# Patient Record
Sex: FEMALE | Race: WHITE | NOT HISPANIC OR LATINO | Employment: PART TIME | URBAN - METROPOLITAN AREA
[De-identification: names, ages, dates, MRNs, and addresses within clinical notes are randomized per-mention and may not be internally consistent; named-entity substitution may affect disease eponyms.]

---

## 2018-01-08 ENCOUNTER — ALLSCRIPTS OFFICE VISIT (OUTPATIENT)
Dept: OTHER | Facility: OTHER | Age: 48
End: 2018-01-08

## 2018-01-08 LAB
BILIRUB UR QL STRIP: NORMAL
CLARITY UR: NORMAL
COLOR UR: YELLOW
GLUCOSE (HISTORICAL): NORMAL
HGB UR QL STRIP.AUTO: 50
KETONES UR STRIP-MCNC: NORMAL MG/DL
LEUKOCYTE ESTERASE UR QL STRIP: NORMAL
NITRITE UR QL STRIP: NORMAL
PH UR STRIP.AUTO: 6.5 [PH]
PROT UR STRIP-MCNC: 15 MG/DL
SP GR UR STRIP.AUTO: 1.01
UROBILINOGEN UR QL STRIP.AUTO: 0.2

## 2018-01-09 NOTE — PROGRESS NOTES
Assessment   1  Pain of paraspinal muscle (729 1) (M79 1)    Plan   Lower back pain, Urinary frequency    · Urine Dip Non-Automated- POC; Status:Complete;   Done: [id number] 03:16PM  Pain of paraspinal muscle    · Naproxen 500 MG Oral Tablet; TAKE 1 TABLET EVERY 12 HOURS AS NEEDED    Discussion/Summary      47F with: Subacute lumbar paraspinal muscle pain- likely related to perimenopause and menstrual cycle related dysmenorrhea  No evidence of nephrolithiasis or UTI, normal urine dip in the office as below  500mg BID starting 2 days before your period heating pad if symptoms worsen or do not improve  The patient was counseled regarding instructions for management,-- prognosis,-- patient and family education,-- impressions  Possible side effects of new medications were reviewed with the patient/guardian today  The treatment plan was reviewed with the patient/guardian  The patient/guardian understands and agrees with the treatment plan      Chief Complaint   1  Back Pain   2  Urinary Frequency    History of Present Illness   HPI: Just started with perimenopausal symptoms, has on and off periods  Has been having periodic back pain  Didnt have a period for most of the summer  Has the lower back aching that started around December and tried heating pads on it, which resolved with her period  She then had period bleeding again a few days ago and the back ache came back  Aleve 220mg PRN which does help   dysuria, urgency, hesitancy, frequency, hematuria  Has a vague urinary fullness  had many UTIs in the past   back pain is in the middle of lower back  Urinary Frequency: Salma Darling presents with complaints of no urinary frequency        Associated symptoms include low back pain-- and-- menstrual change, but-- no urinary urgency,-- no urinary incontinence,-- no dysuria,-- no fever,-- no chills,-- no suprapubic pain,-- no nausea,-- no vomiting,-- no diarrhea,-- no constipation,-- no peripheral edema,-- no vaginal discharge-- and-- no vaginal itching  Review of Systems        Constitutional: no fever-- and-- no chills  Gastrointestinal: no nausea-- and-- no diarrhea  Genitourinary: dysuria, but-- as noted in HPI-- and-- no incontinence  Active Problems   1  Conjunctivitis (372 30) (H10 9)   2  Dermatitis (692 9) (L30 9)   3  Encounter for routine gynecological examination with Papanicolaou smear of cervix     (V72 31,V76 2) (Z01 419)   4  Encounter for screening mammogram for malignant neoplasm of breast (V76 12)     (Z12 31)   5  Screening for HPV (human papillomavirus) (V73 81) (Z11 51)   6  Sinusitis (473 9) (J32 9)    Past Medical History   1  History of  3 (V22 2) (Z33 1)   2  History of pregnancy (V13 29)  Active Problems And Past Medical History Reviewed: The active problems and past medical history were reviewed and updated today  Family History   Mother    1  Family history of cardiac disorder (V17 49) (Z82 49)   2  Family history of diabetes mellitus (V18 0) (Z83 3)   3  Family history of hypertension (V17 49) (Z82 49)   4  Family history of peptic ulcer (V18 59) (Z83 79)   5  Family history of varicose veins (V17 49) (Z82 49)  Father    6  Family history of cardiac disorder (V17 49) (Z82 49)   7  Family history of diabetes mellitus (V18 0) (Z83 3)   8  Family history of hypertension (V17 49) (Z82 49)   9  Family history of peptic ulcer (V18 59) (Z83 79)  Family History    10  Family history of Coronary Artery Disease (V17 49)    Social History    · Never A Smoker    Surgical History   1  History of  Section   2  History of Tubal Ligation    Current Meds    1  Calcium 600+D 600-400 MG-UNIT Oral Tablet; Take as directed Recorded   2  Daily Value Multivitamin TABS; Take 1 tablet daily Recorded     The medication list was reviewed and updated today  Allergies   1   No Known Drug Allergies    Vitals    Recorded: [id number] 03:07PM   Temperature 98 5 F   Heart Rate 80 Respiration 16   Systolic 022, LUE, Sitting   Diastolic 88, LUE, Sitting   Height 5 ft 5 5 in   Weight 274 lb    BMI Calculated 44 9   BSA Calculated 2 27     Physical Exam        Constitutional      General appearance: Abnormal   obese  Pulmonary      Respiratory effort: No increased work of breathing or signs of respiratory distress  Auscultation of lungs: Clear to auscultation  Cardiovascular      Auscultation of heart: Normal rate and rhythm, normal S1 and S2, without murmurs  Abdomen      Abdomen: Non-tender, no masses  -- no CVA or suprapubic tenderness  Musculoskeletal      Gait and station: Normal  -- negative SLR  Digits and nails: Normal without clubbing or cyanosis         Inspection/palpation of joints, bones, and muscles: Normal           Results/Data   Urine Dip Non-Automated- POC [id number] 03:16PM Mina Jeri      Test Name Result Flag Reference   Color Yellow     Clarity Transparent     Leukocytes 15+/-     Nitrite neg     Blood 50     Bilirubin neg     Urobilinogen 0 2     Protein 15     Ph 6 5     Specific Gravity 1 010     Ketone neg     Glucose neg        Urine Dip Non-Automated- POC [id number] 03:16PM Mina Ledyard      Test Name Result Flag Reference   Color Yellow     Clarity Transparent     Leukocytes 15+/-     Nitrite neg     Blood 50     Bilirubin neg     Urobilinogen 0 2     Protein 15     Ph 6 5     Specific Gravity 1 010     Ketone neg     Glucose neg        Signatures    Electronically signed by : ALL Ghosh ; Jan 8 2018  4:40PM EST                       (Author)

## 2018-01-12 NOTE — RESULT NOTES
Message   Recorded as Task   Date: 07/15/2016 10:45 AM, Created By: Wendie Lainez   Task Name: Verify Patient Results   Assigned To:  Maile Carrillo   Regarding Patient: Dinorah Zheng, Status: Active   [facility] Alexey - 15 Jul 2016 10:45 AM        Maile Carrillo - 15 Jul 2016 3:27 PM     TASK EDITED  Card sent that pap is normal        Signatures   Electronically signed by : eNll Ford CNM; Jul 15 2016  3:27PM EST                       (Author)

## 2018-01-23 VITALS
HEIGHT: 66 IN | WEIGHT: 274 LBS | RESPIRATION RATE: 16 BRPM | HEART RATE: 80 BPM | BODY MASS INDEX: 44.03 KG/M2 | SYSTOLIC BLOOD PRESSURE: 142 MMHG | TEMPERATURE: 98.5 F | DIASTOLIC BLOOD PRESSURE: 88 MMHG

## 2018-08-14 ENCOUNTER — OFFICE VISIT (OUTPATIENT)
Dept: URGENT CARE | Facility: MEDICAL CENTER | Age: 48
End: 2018-08-14
Payer: COMMERCIAL

## 2018-08-14 ENCOUNTER — APPOINTMENT (OUTPATIENT)
Dept: RADIOLOGY | Facility: MEDICAL CENTER | Age: 48
End: 2018-08-14
Payer: COMMERCIAL

## 2018-08-14 VITALS
HEART RATE: 82 BPM | TEMPERATURE: 98 F | HEIGHT: 66 IN | RESPIRATION RATE: 18 BRPM | OXYGEN SATURATION: 97 % | DIASTOLIC BLOOD PRESSURE: 92 MMHG | BODY MASS INDEX: 42.59 KG/M2 | SYSTOLIC BLOOD PRESSURE: 146 MMHG | WEIGHT: 265 LBS

## 2018-08-14 DIAGNOSIS — M25.562 ACUTE PAIN OF LEFT KNEE: ICD-10-CM

## 2018-08-14 DIAGNOSIS — M25.562 ACUTE PAIN OF LEFT KNEE: Primary | ICD-10-CM

## 2018-08-14 PROCEDURE — G0382 LEV 3 HOSP TYPE B ED VISIT: HCPCS | Performed by: PHYSICIAN ASSISTANT

## 2018-08-14 PROCEDURE — 73562 X-RAY EXAM OF KNEE 3: CPT

## 2018-08-14 RX ORDER — NAPROXEN 500 MG/1
1 TABLET ORAL EVERY 12 HOURS PRN
COMMUNITY
Start: 2018-01-08 | End: 2020-10-19 | Stop reason: ALTCHOICE

## 2018-08-14 RX ORDER — ERGOCALCIFEROL (VITAMIN D2) 10 MCG
1 TABLET ORAL DAILY
COMMUNITY

## 2018-08-14 NOTE — PROGRESS NOTES
[facility]        NAME: Claudine Hickman is a 46 y o  female  : 1970    MRN: [id number]  DATE: 2018  TIME: 1:23 PM    Assessment and Plan   Acute pain of left knee [M25 562]  1  Acute pain of left knee  XR knee 3 vw left non injury         Patient Instructions     Patient Instructions   Rice measures  Take naproxen as directed  Follow up with PCP in 3-5 days  Proceed to  ER if symptoms worsen  Chief Complaint     Chief Complaint   Patient presents with    Knee Pain     for a few weeks left knee has been achey, last night when she sat down felt like a rip in the left outter aspect  hurts to weight bear  some chills last night  taking ice and advil         History of Present Illness       This is a 57-year-old female complaining of left knee pain x1 week  Patient denies any injury trauma or accident  Patient denies any previous injury  Pain is made worse with ambulation  Patient denies any OTC medications  Patient denies any giving out sensation, locking or popping  Knee Pain          Review of Systems   Review of Systems   Constitutional: Negative for chills and fever  HENT: Negative  Eyes: Negative  Respiratory: Negative for chest tightness, shortness of breath and wheezing  Cardiovascular: Negative for chest pain and palpitations  Musculoskeletal: Positive for arthralgias  Skin: Negative for rash           Current Medications       Current Outpatient Prescriptions:     Multiple Vitamin (DAILY VALUE MULTIVITAMIN) TABS, Take 1 tablet by mouth daily, Disp: , Rfl:     naproxen (NAPROSYN) 500 mg tablet, Take 1 tablet by mouth every 12 (twelve) hours as needed, Disp: , Rfl:     Current Allergies     Allergies as of 2018    (No Known Allergies)            The following portions of the patient's history were reviewed and updated as appropriate: allergies, current medications, past family history, past medical history, past social history, past surgical history and problem list      No past medical history on file  No past surgical history on file  No family history on file  Medications have been verified  Objective   /92   Pulse 82   Temp 98 °F (36 7 °C) (Temporal)   Resp 18   Ht 5' 6" (1 676 m)   Wt 120 kg (265 lb)   LMP 08/06/2018   SpO2 97%   BMI 42 77 kg/m²        Physical Exam     Physical Exam   Constitutional: She is oriented to person, place, and time  She appears well-developed and well-nourished  No distress  Cardiovascular: Normal rate, regular rhythm and normal heart sounds  Pulmonary/Chest: Effort normal and breath sounds normal  No respiratory distress  Musculoskeletal: She exhibits tenderness  Left knee: She exhibits normal range of motion, no swelling and no effusion  Tenderness found  Medial joint line tenderness noted  Negative Lachman's, negative Cooley's, negative pain with valgus/vargus stress   Neurological: She is alert and oriented to person, place, and time  Skin: No rash noted  Nursing note and vitals reviewed

## 2018-10-08 ENCOUNTER — ANNUAL EXAM (OUTPATIENT)
Dept: OBGYN CLINIC | Facility: MEDICAL CENTER | Age: 48
End: 2018-10-08
Payer: COMMERCIAL

## 2018-10-08 VITALS — WEIGHT: 277.8 LBS | DIASTOLIC BLOOD PRESSURE: 76 MMHG | SYSTOLIC BLOOD PRESSURE: 144 MMHG | BODY MASS INDEX: 44.84 KG/M2

## 2018-10-08 DIAGNOSIS — Z12.39 SCREENING FOR MALIGNANT NEOPLASM OF BREAST: ICD-10-CM

## 2018-10-08 DIAGNOSIS — Z01.419 ENCOUNTER FOR GYNECOLOGICAL EXAMINATION (GENERAL) (ROUTINE) WITHOUT ABNORMAL FINDINGS: Primary | ICD-10-CM

## 2018-10-08 PROCEDURE — 99396 PREV VISIT EST AGE 40-64: CPT | Performed by: PHYSICIAN ASSISTANT

## 2018-10-08 RX ORDER — CHLORAL HYDRATE 500 MG
1000 CAPSULE ORAL DAILY
COMMUNITY

## 2018-10-08 RX ORDER — LANOLIN ALCOHOL/MO/W.PET/CERES
1 CREAM (GRAM) TOPICAL 3 TIMES DAILY
COMMUNITY

## 2018-10-08 NOTE — PROGRESS NOTES
Assessment/Plan  Problem List Items Addressed This Visit     Encounter for gynecological examination (general) (routine) without abnormal findings - Primary     Annual exam performed  mammo rx given  RTO 1 year           Other Visit Diagnoses     Screening for malignant neoplasm of breast        Relevant Orders    Mammo screening bilateral w cad        Subjective   Tamie Najera is a 46 y o  female who presents for annual GYN exam  She denies any changes in Medical, Surgical or Family histories  Periods are regular every 28-30 days, however has skipped a few menses here and there  Dysmenorrhea:none  She denies intermenstrual bleeding, spotting, or discharge  She reports that she is sexually active with 1 partner and using none for contraception  Last Pap smear: 2016  Regular self breast exam: yes  Last mammogram:   Family history of uterine or ovarian cancer: no  Family history of breast cancer: no  Family history of colon cancer: no    Menstrual History:  OB History      Para Term  AB Living    2 2            SAB TAB Ectopic Multiple Live Births                      Patient's last menstrual period was 2018  Period Pattern: Regular  Menstrual Flow: Moderate    The following portions of the patient's history were reviewed and updated as appropriate: allergies, current medications, past family history, past medical history, past social history, past surgical history and problem list     Review of Systems  Review of Systems   Constitutional: Negative for chills and fever  Respiratory: Negative for shortness of breath  Cardiovascular: Negative for chest pain  Gastrointestinal: Negative for abdominal pain  Genitourinary: Negative for dysuria, pelvic pain, vaginal bleeding, vaginal discharge and vaginal pain  Negative for breast pain or lumps  (+) mild stress urinary incontinence  Neurological: Negative for headaches        Objective   /76 (BP Location: Right arm)   Wt 126 kg (277 lb 12 8 oz)   LMP 09/12/2018   BMI 44 84 kg/m²     Physical Exam   Constitutional: She is oriented to person, place, and time  She appears well-developed and well-nourished  Neck: No thyromegaly present  Cardiovascular: Normal rate and regular rhythm  Pulmonary/Chest: Effort normal and breath sounds normal  Right breast exhibits no mass, no nipple discharge, no skin change and no tenderness  Left breast exhibits no mass, no nipple discharge, no skin change and no tenderness  Abdominal: Soft  There is no tenderness  There is no rebound and no guarding  Genitourinary: There is no rash or lesion on the right labia  There is no rash or lesion on the left labia  Uterus is not enlarged and not tender  Cervix exhibits no motion tenderness and no discharge  Right adnexum displays no mass and no tenderness  Left adnexum displays no mass and no tenderness  No bleeding in the vagina  No vaginal discharge found  Neurological: She is alert and oriented to person, place, and time  Psychiatric: She has a normal mood and affect  Nursing note and vitals reviewed

## 2018-10-16 ENCOUNTER — HOSPITAL ENCOUNTER (OUTPATIENT)
Dept: RADIOLOGY | Facility: MEDICAL CENTER | Age: 48
Discharge: HOME | End: 2018-10-16
Payer: COMMERCIAL

## 2018-10-16 DIAGNOSIS — Z12.39 SCREENING FOR MALIGNANT NEOPLASM OF BREAST: ICD-10-CM

## 2018-10-16 PROCEDURE — 77067 SCR MAMMO BI INCL CAD: CPT

## 2020-10-19 ENCOUNTER — OFFICE VISIT (OUTPATIENT)
Dept: FAMILY MEDICINE CLINIC | Facility: CLINIC | Age: 50
End: 2020-10-19
Payer: COMMERCIAL

## 2020-10-19 VITALS
BODY MASS INDEX: 43.99 KG/M2 | OXYGEN SATURATION: 97 % | TEMPERATURE: 98.6 F | SYSTOLIC BLOOD PRESSURE: 142 MMHG | DIASTOLIC BLOOD PRESSURE: 88 MMHG | RESPIRATION RATE: 18 BRPM | WEIGHT: 264 LBS | HEIGHT: 65 IN | HEART RATE: 88 BPM

## 2020-10-19 DIAGNOSIS — Z13.1 ENCOUNTER FOR SCREENING EXAMINATION FOR IMPAIRED GLUCOSE REGULATION AND DIABETES MELLITUS: Primary | ICD-10-CM

## 2020-10-19 DIAGNOSIS — E55.9 VITAMIN D DEFICIENCY: ICD-10-CM

## 2020-10-19 DIAGNOSIS — R03.0 ELEVATED BLOOD PRESSURE READING WITHOUT DIAGNOSIS OF HYPERTENSION: ICD-10-CM

## 2020-10-19 DIAGNOSIS — Z12.31 ENCOUNTER FOR SCREENING MAMMOGRAM FOR MALIGNANT NEOPLASM OF BREAST: ICD-10-CM

## 2020-10-19 DIAGNOSIS — R03.0 ELEVATED BLOOD-PRESSURE READING WITHOUT DIAGNOSIS OF HYPERTENSION: ICD-10-CM

## 2020-10-19 DIAGNOSIS — R27.8 WORSENED HANDWRITING: ICD-10-CM

## 2020-10-19 DIAGNOSIS — E66.01 MORBID OBESITY DUE TO EXCESS CALORIES (HCC): ICD-10-CM

## 2020-10-19 DIAGNOSIS — Z23 ENCOUNTER FOR IMMUNIZATION: ICD-10-CM

## 2020-10-19 DIAGNOSIS — Z12.11 SCREENING FOR COLON CANCER: ICD-10-CM

## 2020-10-19 DIAGNOSIS — Z13.220 SCREENING FOR HYPERLIPIDEMIA: ICD-10-CM

## 2020-10-19 PROCEDURE — 90471 IMMUNIZATION ADMIN: CPT

## 2020-10-19 PROCEDURE — 99396 PREV VISIT EST AGE 40-64: CPT | Performed by: FAMILY MEDICINE

## 2020-10-19 PROCEDURE — 3725F SCREEN DEPRESSION PERFORMED: CPT | Performed by: FAMILY MEDICINE

## 2020-10-19 PROCEDURE — 1036F TOBACCO NON-USER: CPT | Performed by: FAMILY MEDICINE

## 2020-10-19 PROCEDURE — 90715 TDAP VACCINE 7 YRS/> IM: CPT

## 2020-10-19 RX ORDER — ASCORBIC ACID 500 MG
500 TABLET ORAL DAILY
COMMUNITY

## 2020-11-01 ENCOUNTER — PATIENT MESSAGE (OUTPATIENT)
Dept: FAMILY MEDICINE CLINIC | Facility: CLINIC | Age: 50
End: 2020-11-01

## 2020-11-01 DIAGNOSIS — R25.9 PARKINSONIAN FEATURES: ICD-10-CM

## 2020-11-01 DIAGNOSIS — R27.8 WORSENED HANDWRITING: Primary | ICD-10-CM

## 2020-11-02 LAB
25(OH)D3 SERPL-MCNC: 34 NG/ML (ref 30–100)
ALBUMIN/CREAT UR: 4 MCG/MG CREAT
BASOPHILS # BLD AUTO: 59 CELLS/UL (ref 0–200)
BASOPHILS NFR BLD AUTO: 0.9 %
BUN SERPL-MCNC: 11 MG/DL (ref 7–25)
BUN/CREAT SERPL: NORMAL (CALC) (ref 6–22)
CALCIUM SERPL-MCNC: 9.4 MG/DL (ref 8.6–10.4)
CHLORIDE SERPL-SCNC: 103 MMOL/L (ref 98–110)
CHOLEST SERPL-MCNC: 198 MG/DL
CHOLEST/HDLC SERPL: 3.4 (CALC)
CO2 SERPL-SCNC: 27 MMOL/L (ref 20–32)
CREAT SERPL-MCNC: 0.58 MG/DL (ref 0.5–1.05)
CREAT UR-MCNC: 53 MG/DL (ref 20–275)
EOSINOPHIL # BLD AUTO: 78 CELLS/UL (ref 15–500)
EOSINOPHIL NFR BLD AUTO: 1.2 %
ERYTHROCYTE [DISTWIDTH] IN BLOOD BY AUTOMATED COUNT: 11.7 % (ref 11–15)
GLUCOSE SERPL-MCNC: 97 MG/DL (ref 65–99)
HBA1C MFR BLD: 5.4 % OF TOTAL HGB
HCT VFR BLD AUTO: 43.4 % (ref 35–45)
HDLC SERPL-MCNC: 59 MG/DL
HGB BLD-MCNC: 14.1 G/DL (ref 11.7–15.5)
LDLC SERPL CALC-MCNC: 114 MG/DL (CALC)
LYMPHOCYTES # BLD AUTO: 2392 CELLS/UL (ref 850–3900)
LYMPHOCYTES NFR BLD AUTO: 36.8 %
MCH RBC QN AUTO: 29.3 PG (ref 27–33)
MCHC RBC AUTO-ENTMCNC: 32.5 G/DL (ref 32–36)
MCV RBC AUTO: 90 FL (ref 80–100)
MICROALBUMIN UR-MCNC: 0.2 MG/DL
MONOCYTES # BLD AUTO: 572 CELLS/UL (ref 200–950)
MONOCYTES NFR BLD AUTO: 8.8 %
NEUTROPHILS # BLD AUTO: 3400 CELLS/UL (ref 1500–7800)
NEUTROPHILS NFR BLD AUTO: 52.3 %
NONHDLC SERPL-MCNC: 139 MG/DL (CALC)
PLATELET # BLD AUTO: 324 THOUSAND/UL (ref 140–400)
PMV BLD REES-ECKER: 12 FL (ref 7.5–12.5)
POTASSIUM SERPL-SCNC: 4.2 MMOL/L (ref 3.5–5.3)
RBC # BLD AUTO: 4.82 MILLION/UL (ref 3.8–5.1)
SL AMB EGFR AFRICAN AMERICAN: 123 ML/MIN/1.73M2
SL AMB EGFR NON AFRICAN AMERICAN: 106 ML/MIN/1.73M2
SODIUM SERPL-SCNC: 138 MMOL/L (ref 135–146)
TRIGL SERPL-MCNC: 135 MG/DL
TSH SERPL-ACNC: 1.47 MIU/L
WBC # BLD AUTO: 6.5 THOUSAND/UL (ref 3.8–10.8)

## 2020-11-04 ENCOUNTER — TELEPHONE (OUTPATIENT)
Dept: NEUROLOGY | Facility: CLINIC | Age: 50
End: 2020-11-04

## 2020-11-04 ENCOUNTER — TELEPHONE (OUTPATIENT)
Dept: FAMILY MEDICINE CLINIC | Facility: CLINIC | Age: 50
End: 2020-11-04

## 2020-11-10 DIAGNOSIS — Z91.89 AT HIGH RISK FOR TICK BORNE ILLNESS: Primary | ICD-10-CM

## 2020-11-17 LAB — B BURGDOR AB SER IA-ACNC: <0.9 INDEX

## 2020-12-01 ENCOUNTER — CLINICAL SUPPORT (OUTPATIENT)
Dept: FAMILY MEDICINE CLINIC | Facility: CLINIC | Age: 50
End: 2020-12-01
Payer: COMMERCIAL

## 2020-12-01 ENCOUNTER — TELEPHONE (OUTPATIENT)
Dept: FAMILY MEDICINE CLINIC | Facility: CLINIC | Age: 50
End: 2020-12-01

## 2020-12-01 VITALS
TEMPERATURE: 97.3 F | SYSTOLIC BLOOD PRESSURE: 124 MMHG | BODY MASS INDEX: 41.95 KG/M2 | RESPIRATION RATE: 18 BRPM | HEART RATE: 76 BPM | HEIGHT: 66 IN | WEIGHT: 261 LBS | OXYGEN SATURATION: 96 % | DIASTOLIC BLOOD PRESSURE: 64 MMHG

## 2020-12-01 DIAGNOSIS — I10 HYPERTENSION, UNSPECIFIED TYPE: Primary | ICD-10-CM

## 2020-12-01 PROCEDURE — 99211 OFF/OP EST MAY X REQ PHY/QHP: CPT

## 2020-12-01 PROCEDURE — 3008F BODY MASS INDEX DOCD: CPT

## 2020-12-01 PROCEDURE — 1036F TOBACCO NON-USER: CPT

## 2020-12-02 PROBLEM — Z01.419 ENCOUNTER FOR GYNECOLOGICAL EXAMINATION (GENERAL) (ROUTINE) WITHOUT ABNORMAL FINDINGS: Status: RESOLVED | Noted: 2018-10-08 | Resolved: 2020-12-02

## 2020-12-23 ENCOUNTER — TELEPHONE (OUTPATIENT)
Dept: FAMILY MEDICINE CLINIC | Facility: CLINIC | Age: 50
End: 2020-12-23

## 2021-02-11 ENCOUNTER — OFFICE VISIT (OUTPATIENT)
Dept: URGENT CARE | Facility: MEDICAL CENTER | Age: 51
End: 2021-02-11
Payer: COMMERCIAL

## 2021-02-11 VITALS
SYSTOLIC BLOOD PRESSURE: 150 MMHG | OXYGEN SATURATION: 98 % | HEART RATE: 73 BPM | RESPIRATION RATE: 18 BRPM | TEMPERATURE: 98.3 F | WEIGHT: 267 LBS | DIASTOLIC BLOOD PRESSURE: 68 MMHG | BODY MASS INDEX: 42.91 KG/M2 | HEIGHT: 66 IN

## 2021-02-11 DIAGNOSIS — S00.31XA ABRASION OF NOSE, INITIAL ENCOUNTER: Primary | ICD-10-CM

## 2021-02-11 PROCEDURE — G0382 LEV 3 HOSP TYPE B ED VISIT: HCPCS | Performed by: PHYSICIAN ASSISTANT

## 2021-02-11 NOTE — PATIENT INSTRUCTIONS
1  Keep skin clean and dry  2  Watch for S&S of infection (redness, swelling, drainage, fever)  3  Follow up with PCP in 3-5 days if symptoms persist or worsen

## 2021-02-11 NOTE — PROGRESS NOTES
[facility]        NAME: Jah Ramírez is a 44 y o  female  : 1970    MRN: [id number]  DATE: 2021  TIME: 4:32 PM    Assessment and Plan   Abrasion of nose, initial encounter Dipika Pimentel  1  Abrasion of nose, initial encounter           Patient Instructions     1  Keep skin clean and dry  2  Watch for S&S of infection (redness, swelling, drainage, fever)  3  Follow up with PCP in 3-5 days if symptoms persist or worsen  Chief Complaint     Chief Complaint   Patient presents with    Fall     Pt stepped in a pothole and scraped her face with her glasses  Pt has several superfical scratched on her dfface and one very small laceration on the right side of the bridge of her nose  Pt denies losing consciousness  Tdap: 10/19/20  History of Present Illness       [place] is a 49-year-old female presents with a small abrasion on the right side of her nose after fall that occurred earlier today  Patient reports she was walking when she stepped in a hole and fell which cause her glasses to scrape the right side of her nose  Patient denies hitting her head, she has had no loss of consciousness, post injury vomiting, headache or dizziness since the event  Patient reports her last tetanus vaccine was 1 year prior  Review of Systems   Review of Systems   Constitutional: Negative  HENT: Negative  Respiratory: Negative  Cardiovascular: Negative  Gastrointestinal: Negative  Skin: Positive for wound           Current Medications       Current Outpatient Medications:     ascorbic acid (VITAMIN C) 500 mg tablet, Take 500 mg by mouth daily, Disp: , Rfl:     carbidopa-levodopa (SINEMET)  mg per tablet, Take 1 tablet by mouth Three times a day, Disp: , Rfl:     glucosamine-chondroitin 500-400 MG tablet, Take 1 tablet by mouth 3 (three) times a day, Disp: , Rfl:     Multiple Vitamin (DAILY VALUE MULTIVITAMIN) TABS, Take 1 tablet by mouth daily, Disp: , Rfl:     Omega-3 Fatty Acids (FISH OIL) 1,000 mg, Take 1,000 mg by mouth daily, Disp: , Rfl:     Current Allergies     Allergies as of 02/11/2021    (No Known Allergies)            The following portions of the patient's history were reviewed and updated as appropriate: allergies, current medications, past family history, past medical history, past social history, past surgical history and problem list      Past Medical History:   Diagnosis Date    Parkinson disease ([facility] [address] )        History reviewed  No pertinent surgical history  History reviewed  No pertinent family history  Medications have been verified  Objective   /68   Pulse 73   Temp 98 3 °F (36 8 °C) (Temporal)   Resp 18   Ht 5' 6" (1 676 m)   Wt 121 kg (267 lb)   LMP 12/29/2020 (Approximate)   SpO2 98%   BMI 43 09 kg/m²   Patient's last menstrual period was 12/29/2020 (approximate)  Physical Exam     Physical Exam  Constitutional:       General: She is not in acute distress  Appearance: Normal appearance  She is not ill-appearing  HENT:      Head: Normocephalic and atraumatic  Right Ear: Tympanic membrane and ear canal normal       Left Ear: Tympanic membrane and ear canal normal       Nose: Signs of injury present  Mouth/Throat:      Lips: Pink  Pharynx: Oropharynx is clear  Cardiovascular:      Rate and Rhythm: Normal rate and regular rhythm  Heart sounds: Normal heart sounds  No murmur  Pulmonary:      Effort: Pulmonary effort is normal       Breath sounds: Normal breath sounds  Neurological:      Mental Status: She is alert  Skin was cleaned with Betadine wash and Dermabond was applied over the top of the abrasion  Patient tolerated procedure well, there were no complications

## 2021-03-03 ENCOUNTER — TELEPHONE (OUTPATIENT)
Dept: FAMILY MEDICINE CLINIC | Facility: CLINIC | Age: 51
End: 2021-03-03

## 2021-03-03 DIAGNOSIS — Z11.52 ENCOUNTER FOR SCREENING FOR COVID-19: Primary | ICD-10-CM

## 2021-03-03 PROCEDURE — U0003 INFECTIOUS AGENT DETECTION BY NUCLEIC ACID (DNA OR RNA); SEVERE ACUTE RESPIRATORY SYNDROME CORONAVIRUS 2 (SARS-COV-2) (CORONAVIRUS DISEASE [COVID-19]), AMPLIFIED PROBE TECHNIQUE, MAKING USE OF HIGH THROUGHPUT TECHNOLOGIES AS DESCRIBED BY CMS-2020-01-R: HCPCS | Performed by: FAMILY MEDICINE

## 2021-03-03 PROCEDURE — U0005 INFEC AGEN DETEC AMPLI PROBE: HCPCS | Performed by: FAMILY MEDICINE

## 2021-03-03 NOTE — TELEPHONE ENCOUNTER
Patient called back asking if order for COVID test was put in since she wants to go get done today   Advised order was placed

## 2021-03-03 NOTE — TELEPHONE ENCOUNTER
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

## 2021-03-03 NOTE — PROGRESS NOTES
Patient called back stating she did want to have COVID test done after checking with insurance that it is covered

## 2021-03-04 ENCOUNTER — TELEPHONE (OUTPATIENT)
Dept: FAMILY MEDICINE CLINIC | Facility: CLINIC | Age: 51
End: 2021-03-04

## 2021-03-04 LAB — SARS-COV-2 RNA RESP QL NAA+PROBE: NEGATIVE

## 2021-03-04 NOTE — TELEPHONE ENCOUNTER
Previously spoke to patient about potential for shortened quarantine ending tomorrow 3/5/21  Left detailed message for patient on voicemail  Told to call office with any questions

## 2021-03-04 NOTE — TELEPHONE ENCOUNTER
Call re COV 19 test NEGATIVE    The CDC recommends 14 days of quarantine to reduce risk of transmission  Quarantine can be shortened to 7 days provided that patient remains symptom free and COVID test is negative and done on at least day 5 after exposure  Patient should continue to monitor for symptoms through day 14

## 2021-03-10 DIAGNOSIS — Z23 ENCOUNTER FOR IMMUNIZATION: ICD-10-CM

## 2021-03-29 ENCOUNTER — TELEPHONE (OUTPATIENT)
Dept: FAMILY MEDICINE CLINIC | Facility: CLINIC | Age: 51
End: 2021-03-29

## 2021-06-24 ENCOUNTER — TELEPHONE (OUTPATIENT)
Dept: FAMILY MEDICINE CLINIC | Facility: CLINIC | Age: 51
End: 2021-06-24

## 2021-11-23 ENCOUNTER — OFFICE VISIT (OUTPATIENT)
Dept: OBGYN CLINIC | Facility: MEDICAL CENTER | Age: 51
End: 2021-11-23
Payer: COMMERCIAL

## 2021-11-23 VITALS — DIASTOLIC BLOOD PRESSURE: 72 MMHG | WEIGHT: 264.4 LBS | BODY MASS INDEX: 42.68 KG/M2 | SYSTOLIC BLOOD PRESSURE: 130 MMHG

## 2021-11-23 DIAGNOSIS — Z01.419 ENCOUNTER FOR GYNECOLOGICAL EXAMINATION (GENERAL) (ROUTINE) WITHOUT ABNORMAL FINDINGS: Primary | ICD-10-CM

## 2021-11-23 DIAGNOSIS — Z12.31 ENCOUNTER FOR SCREENING MAMMOGRAM FOR MALIGNANT NEOPLASM OF BREAST: ICD-10-CM

## 2021-11-23 DIAGNOSIS — Z11.51 SCREENING FOR HPV (HUMAN PAPILLOMAVIRUS): ICD-10-CM

## 2021-11-23 PROCEDURE — 99386 PREV VISIT NEW AGE 40-64: CPT | Performed by: NURSE PRACTITIONER

## 2021-11-23 PROCEDURE — 1036F TOBACCO NON-USER: CPT | Performed by: NURSE PRACTITIONER

## 2021-11-29 LAB
CYTOLOGIST CVX/VAG CYTO: NORMAL
DX ICD CODE: NORMAL
HPV I/H RISK 4 DNA CVX QL PROBE+SIG AMP: NEGATIVE
OTHER STN SPEC: NORMAL
PATH REPORT.FINAL DX SPEC: NORMAL
SL AMB NOTE:: NORMAL
SL AMB SPECIMEN ADEQUACY: NORMAL
SL AMB TEST METHODOLOGY: NORMAL

## 2021-12-25 ENCOUNTER — NURSE TRIAGE (OUTPATIENT)
Dept: OTHER | Facility: OTHER | Age: 51
End: 2021-12-25

## 2021-12-25 DIAGNOSIS — Z20.828 SARS-ASSOCIATED CORONAVIRUS EXPOSURE: Primary | ICD-10-CM

## 2021-12-27 PROCEDURE — U0003 INFECTIOUS AGENT DETECTION BY NUCLEIC ACID (DNA OR RNA); SEVERE ACUTE RESPIRATORY SYNDROME CORONAVIRUS 2 (SARS-COV-2) (CORONAVIRUS DISEASE [COVID-19]), AMPLIFIED PROBE TECHNIQUE, MAKING USE OF HIGH THROUGHPUT TECHNOLOGIES AS DESCRIBED BY CMS-2020-01-R: HCPCS | Performed by: FAMILY MEDICINE

## 2021-12-27 PROCEDURE — U0005 INFEC AGEN DETEC AMPLI PROBE: HCPCS | Performed by: FAMILY MEDICINE

## 2022-01-04 ENCOUNTER — OFFICE VISIT (OUTPATIENT)
Dept: DERMATOLOGY | Facility: CLINIC | Age: 52
End: 2022-01-04
Payer: COMMERCIAL

## 2022-01-04 VITALS — WEIGHT: 261 LBS | BODY MASS INDEX: 41.95 KG/M2 | TEMPERATURE: 97.4 F | HEIGHT: 66 IN

## 2022-01-04 DIAGNOSIS — L81.4 LENTIGO: ICD-10-CM

## 2022-01-04 DIAGNOSIS — L82.1 SEBORRHEIC KERATOSIS: ICD-10-CM

## 2022-01-04 DIAGNOSIS — D22.9 MULTIPLE MELANOCYTIC NEVI: Primary | ICD-10-CM

## 2022-01-04 DIAGNOSIS — L85.3 XEROSIS OF SKIN: ICD-10-CM

## 2022-01-04 DIAGNOSIS — D18.01 CHERRY ANGIOMA: ICD-10-CM

## 2022-01-04 PROCEDURE — 99202 OFFICE O/P NEW SF 15 MIN: CPT | Performed by: DERMATOLOGY

## 2022-01-04 NOTE — PATIENT INSTRUCTIONS
MELANOCYTIC NEVI ("Moles")    Assessment and Plan:  Based on a thorough discussion of this condition and the management approach to it (including a comprehensive discussion of the known risks, side effects and potential benefits of treatment), the patient (family) agrees to implement the following specific plan:   When outside we recommend using a wide brim hat, sunglasses, long sleeve and pants, sunscreen with SPF 60+ with reapplication every 2 hours, or SPF specific clothing    Benign, reassured   Annual skin check     Melanocytic Nevi  Melanocytic nevi ("moles") are tan or brown, raised or flat areas of the skin which have an increased number of melanocytes  Melanocytes are the cells in our body which make pigment and account for skin color  Some moles are present at birth (I e , "congenital nevi"), while others come up later in life (i e , "acquired nevi")  The sun can stimulate the body to make more moles  Sunburns are not the only thing that triggers more moles  Chronic sun exposure can do it too  Clinically distinguishing a healthy mole from melanoma may be difficult, even for experienced dermatologists  The "ABCDE's" of moles have been suggested as a means of helping to alert a person to a suspicious mole and the possible increased risk of melanoma  The suggestions for raising alert are as follows:    Asymmetry: Healthy moles tend to be symmetric, while melanomas are often asymmetric  Asymmetry means if you draw a line through the mole, the two halves do not match in color, size, shape, or surface texture  Asymmetry can be a result of rapid enlargement of a mole, the development of a raised area on a previously flat lesion, scaling, ulceration, bleeding or scabbing within the mole  Any mole that starts to demonstrate "asymmetry" should be examined promptly by a board certified dermatologist      Border: Healthy moles tend to have discrete, even borders    The border of a melanoma often blends into the normal skin and does not sharply delineate the mole from normal skin  Any mole that starts to demonstrate "uneven borders" should be examined promptly by a board certified dermatologist      Color: Healthy moles tend to be one color throughout  Melanomas tend to be made up of different colors ranging from dark black, blue, white, or red  Any mole that demonstrates a color change should be examined promptly by a board certified dermatologist      Diameter: Healthy moles tend to be smaller than 0 6 cm in size; an exception are "congenital nevi" that can be larger  Melanomas tend to grow and can often be greater than 0 6 cm (1/4 of an inch, or the size of a pencil eraser)  This is only a guideline, and many normal moles may be larger than 0 6 cm without being unhealthy  Any mole that starts to change in size (small to bigger or bigger to smaller) should be examined promptly by a board certified dermatologist      Evolving: Healthy moles tend to "stay the same "  Melanomas may often show signs of change or evolution such as a change in size, shape, color, or elevation  Any mole that starts to itch, bleed, crust, burn, hurt, or ulcerate or demonstrate a change or evolution should be examined promptly by a board certified dermatologist       Paulina Dykes and Plan:  Based on a thorough discussion of this condition and the management approach to it (including a comprehensive discussion of the known risks, side effects and potential benefits of treatment), the patient (family) agrees to implement the following specific plan:   When outside we recommend using a wide brim hat, sunglasses, long sleeve and pants, sunscreen with SPF 15+ with reapplication every 2 hours, or SPF specific clothing     What is a lentigo? A lentigo is a pigmented flat or slightly raised lesion with a clearly defined edge  Unlike an ephelis (freckle), it does not fade in the winter months   There are several kinds of lentigo  The name lentigo originally referred to its appearance resembling a small lentil  The plural of lentigo is lentigines, although lentigos is also in common use  Who gets lentigines? Lentigines can affect males and females of all ages and races  Solar lentigines are especially prevalent in fair skinned adults  Lentigines associated with syndromes are present at birth or arise during childhood  What causes lentigines? Common forms of lentigo are due to exposure to ultraviolet radiation:   Sun damage including sunburn    Indoor tanning    Phototherapy, especially photochemotherapy (PUVA)    Ionizing radiation, eg radiation therapy, can also cause lentigines  Several familial syndromes associated with widespread lentigines originate from mutations in Jeff-MAP kinase, mTOR signaling and PTEN pathways  What is the treatment for lentigines? Most lentigines are left alone  Attempts to lighten them may not be successful  The following approaches are used:   SPF 50+ broad-spectrum sunscreen    Hydroquinone bleaching cream    Alpha hydroxy acids    Vitamin C    Retinoids    Azelaic acid    Chemical peels  Individual lesions can be permanently removed using:   Cryotherapy    Intense pulsed light    Pigment lasers    How can lentigines be prevented? Lentigines associated with exposure ultraviolet radiation can be prevented by very careful sun protection  Clothing is more successful at preventing new lentigines than are sunscreens  What is the outlook for lentigines? Lentigines usually persist  They may increase in number with age and sun exposure  Some in sun-protected sites may fade and disappear      GRAYSON ANGIOMAS  Assessment and Plan:  Based on a thorough discussion of this condition and the management approach to it (including a comprehensive discussion of the known risks, side effects and potential benefits of treatment), the patient (family) agrees to implement the following specific plan:   Monitor for changes   Benign, reassured    Assessment and Plan:    Cherry angioma, also known as [employer] spots, are benign vascular skin lesions  A "cherry angioma" is a firm red, blue or purple papule, 0 1-1 cm in diameter  When thrombosed, they can appear black in colour until evaluated with a dermatoscope when the red or purple colour is more easily seen  Cherry angioma may develop on any part of the body but most often appear on the scalp, face, lips and trunk  An angioma is due to proliferating endothelial cells; these are the cells that line the inside of a blood vessel  Angiomas can arise in early life or later in life; the most common type of angioma is a cherry angioma  Cherry angiomas are very common in males and females of any age or race  They are more noticeable in white skin than in skin of colour  They markedly increase in number from about the age of 36  There may be a family history of similar lesions  Eruptive cherry angiomas have been rarely reported to be associated with internal malignancy  The cause of angiomas is unknown  Genetic analysis of cherry angiomas has shown that they frequently carry specific somatic missense mutations in the GNAQ and GNA11 (Q209H) genes, which are involved in other vascular and melanocytic proliferations  SEBORRHEIC KERATOSIS; NON-INFLAMED    Assessment and Plan:  Based on a thorough discussion of this condition and the management approach to it (including a comprehensive discussion of the known risks, side effects and potential benefits of treatment), the patient (family) agrees to implement the following specific plan:   Monitor for changes   Benign, reassured    Seborrheic Keratosis  A seborrheic keratosis is a harmless warty spot that appears during adult life as a common sign of skin aging  Seborrheic keratoses can arise on any area of skin, covered or uncovered, with the usual exception of the palms and soles   They do not arise from mucous membranes  Seborrheic keratoses can have highly variable appearance  Seborrheic keratoses are extremely common  It has been estimated that over 90% of adults over the age of 61 years have one or more of them  They occur in males and females of all races, typically beginning to erupt in the 35s or 45s  They are uncommon under the age of 21 years  The precise cause of seborrhoeic keratoses is not known  Seborrhoeic keratoses are considered degenerative in nature  As time goes by, seborrheic keratoses tend to become more numerous  Some people inherit a tendency to develop a very large number of them; some people may have hundreds of them  There is no easy way to remove multiple lesions on a single occasion  Unless a specific lesion is "inflamed" and is causing pain or stinging/burning or is bleeding, most insurance companies do not authorize treatment  XEROSIS ("DRY SKIN")    Assessment and Plan:  Based on a thorough discussion of this condition and the management approach to it (including a comprehensive discussion of the known risks, side effects and potential benefits of treatment), the patient (family) agrees to implement the following specific plan:   Use moisturizer like Eucerin,Cerave or Aveeno Cream 3 times a day for the dry skin               Dry skin refers to skin that feels dry to touch  Dry skin has a dull surface with a rough, scaly quality  The skin is less pliable and cracked  When dryness is severe, the skin may become inflamed and fissured  Although any body site can be dry, dry skin tends to affect the shins more than any other site  Dry skin is lacking moisture in the outer horny cell layer (stratum corneum) and this results in cracks in the skin surface  Dry skin is also called xerosis, xeroderma or asteatosis (lack of fat)  It can affect males and females of all ages  There is some racial variability in water and lipid content of the skin     Dry skin that starts in early childhood may be one of about 20 types of ichthyosis (fish-scale skin)  There is often a family history of dry skin   Dry skin is commonly seen in people with atopic dermatitis   Nearly everyone > 60 years has dry skin  Dry skin that begins later may be seen in people with certain diseases and conditions   Postmenopausal women   Hypothyroidism   Chronic renal disease    Malnutrition and weight loss    Subclinical dermatitis    Treatment with certain drugs such as oral retinoids, diuretics and epidermal growth factor receptor inhibitors      What is the treatment for dry skin? The mainstay of treatment of dry skin and ichthyosis is moisturisers/emollients  They should be applied liberally and often enough to:   Reduce itch    Improve the barrier function    Prevent entry of irritants, bacteria    Reduce transepidermal water loss  How can dry skin be prevented? Eliminate aggravating factors:   Reduce the frequency of bathing   A humidifier in winter and air conditioner in summer    Compare having a short shower with a prolonged soak in a bath   Use lukewarm, not hot, water   Replace standard soap with a substitute such as a synthetic detergent cleanser, water-miscible emollient, bath oil, anti-pruritic tar oil, colloidal oatmeal etc     Apply an emollient liberally and often, particularly shortly after bathing, and when itchy  The drier the skin, the thicker this should be, especially on the hands  What is the outlook for dry skin? A tendency to dry skin may persist life-long, or it may improve once contributing factors are controlled

## 2022-01-04 NOTE — PROGRESS NOTES
[facility] Note     Patient Name: Melina Berg  Encounter Date: 1/4/2022     Have you been cared for by a [facility] in the last 3 years and, if so, which one? No    · Have you traveled outside of the [address] [place] [address] in the past 3 months or outside of the [facility] area in the last 2 weeks? No     May we call your Preferred Phone number to discuss your specific medical information? Yes     May we leave a detailed message that includes your specific medical information? Yes      Today's Chief Concerns:   Concern #1:  Spot of concern   Concern #2:      Past Medical History:  Have you personally ever had or currently have any of the following? · Skin cancer (such as Melanoma, Basal Cell Carcinoma, Squamous Cell Carcinoma? (If Yes, please provide more detail)- No  · Eczema: No  · Psoriasis: No  · HIV/AIDS: No  · Hepatitis B or C: No  · Tuberculosis: No  · Systemic Immunosuppression such as Diabetes, Biologic or Immunotherapy, Chemotherapy, Organ Transplantation, Bone Marrow Transplantation (If YES, please provide more detail): No  · Radiation Treatment (If YES, please provide more detail): No  · Any other major medical conditions/concerns? (If Yes, which types)- No    Social History:     What is/was your primary occupation? Teacher     What are your hobbies/past-times? Family History:  Have any of your "first degree relatives" (parent, brother, sister, or child) had any of the following       · Skin cancer such as Melanoma or Merkel Cell Carcinoma or Pancreatic Cancer? No  · Eczema, Asthma, Hay Fever or Seasonal Allergies: YES, Asthma  · Psoriasis or Psoriatic Arthritis: No  · Do any other medical conditions seem to run in your family? If Yes, what condition and which relatives?   YES, Mother: gallbladder cancer    Current Medications:   (please update all dermatological medications before printing patient's AVS!)      Current Outpatient Medications:     ascorbic acid (VITAMIN C) 500 mg tablet, Take 500 mg by mouth daily, Disp: , Rfl:     glucosamine-chondroitin 500-400 MG tablet, Take 1 tablet by mouth 3 (three) times a day, Disp: , Rfl:     Multiple Vitamin (DAILY VALUE MULTIVITAMIN) TABS, Take 1 tablet by mouth daily, Disp: , Rfl:     Omega-3 Fatty Acids (FISH OIL) 1,000 mg, Take 1,000 mg by mouth daily, Disp: , Rfl:     carbidopa-levodopa (SINEMET)  mg per tablet, Take 1 tablet by mouth Three times a day, Disp: , Rfl:       Review of Systems:  Have you recently had or currently have any of the following? If YES, what are you doing for the problem? · Fever, chills or unintended weight loss: No  · Sudden loss or change in your vision: No  · Nausea, vomiting or blood in your stool: No  · Painful or swollen joints: No  · Wheezing or cough: No  · Changing mole or non-healing wound: No  · Nosebleeds: No  · Excessive sweating: No  · Easy or prolonged bleeding? No  · Over the last 2 weeks, how often have you been bothered by the following problems? · Taking little interest or pleasure in doing things: 1 - Not at All  · Feeling down, depressed, or hopeless: 1 - Not at All  · Rapid heartbeat with epinephrine:  No    · FEMALES ONLY:    · Are you pregnant or planning to become pregnant? No  · Are you currently or planning to be nursing or breast feeding? No    · Any known allergies? No Known Allergies      Physical Exam:     Was a chaperone (Derm Clinical Assistant) present throughout the entire Physical Exam? Yes     Did the Dermatology Team specifically  the patient on the importance of a Full Skin Exam to be sure that nothing is missed clinically?  Yes}  o Did the patient ultimately request or accept a Full Skin Exam?  Yes  o Did the patient specifically refuse to have the areas "under-the-bra" examined by the Dermatologist? No  o Did the patient specifically refuse to have the areas "under-the-underwear" examined by the Dermatologist? No    CONSTITUTIONAL:   Vitals:    01/04/22 1508   Temp: (!) 97 4 °F (36 3 °C)   Weight: 118 kg (261 lb)   Height: 5' 6" (1 676 m)         PSYCH: Normal mood and affect  EYES: Normal conjunctiva  ENT: Normal lips and oral mucosa  CARDIOVASCULAR: No edema  RESPIRATORY: Normal respirations  HEME/LYMPH/IMMUNO:  No regional lymphadenopathy except as noted below in "ASSESSMENT AND PLAN BY DIAGNOSIS"    SKIN:  FULL ORGAN SYSTEM EXAM   Hair, Scalp, Ears, Face Normal except as noted below in Assessment   Neck, Cervical Chain Nodes Normal except as noted below in Assessment   Right Arm/Hand/Fingers Normal except as noted below in Assessment   Left Arm/Hand/Fingers Normal except as noted below in Assessment   Chest/Breasts/Axillae Viewed areas Normal except as noted below in Assessment   Abdomen, Umbilicus Normal except as noted below in Assessment   Back/Spine Normal except as noted below in Assessment   Groin/Genitalia/Buttocks NOT EXAMINED   Right Leg Normal except as noted below in Assessment   Left Leg Normal except as noted below in Assessment        Assessment and Plan by Diagnosis:    MELANOCYTIC NEVI ("Moles")    Physical Exam:   Anatomic Location Affected:   Mostly on sun-exposed areas of the trunk and extremities   Morphological Description:  Scattered, 1-4mm round to ovoid, symmetrical-appearing, even bordered, skin colored to dark brown macules/papules, mostly in sun-exposed areas   Pertinent Positives:   Pertinent Negatives: Additional History of Present Condition:  Found on exam today      Assessment and Plan:  Based on a thorough discussion of this condition and the management approach to it (including a comprehensive discussion of the known risks, side effects and potential benefits of treatment), the patient (family) agrees to implement the following specific plan:   When outside we recommend using a wide brim hat, sunglasses, long sleeve and pants, sunscreen with SPF 30+ with reapplication every 2 hours, or SPF specific clothing    Benign, reassured   Annual skin check     Melanocytic Nevi  Melanocytic nevi ("moles") are tan or brown, raised or flat areas of the skin which have an increased number of melanocytes  Melanocytes are the cells in our body which make pigment and account for skin color  Some moles are present at birth (I e , "congenital nevi"), while others come up later in life (i e , "acquired nevi")  The sun can stimulate the body to make more moles  Sunburns are not the only thing that triggers more moles  Chronic sun exposure can do it too  Clinically distinguishing a healthy mole from melanoma may be difficult, even for experienced dermatologists  The "ABCDE's" of moles have been suggested as a means of helping to alert a person to a suspicious mole and the possible increased risk of melanoma  The suggestions for raising alert are as follows:    Asymmetry: Healthy moles tend to be symmetric, while melanomas are often asymmetric  Asymmetry means if you draw a line through the mole, the two halves do not match in color, size, shape, or surface texture  Asymmetry can be a result of rapid enlargement of a mole, the development of a raised area on a previously flat lesion, scaling, ulceration, bleeding or scabbing within the mole  Any mole that starts to demonstrate "asymmetry" should be examined promptly by a board certified dermatologist      Border: Healthy moles tend to have discrete, even borders  The border of a melanoma often blends into the normal skin and does not sharply delineate the mole from normal skin  Any mole that starts to demonstrate "uneven borders" should be examined promptly by a board certified dermatologist      Color: Healthy moles tend to be one color throughout  Melanomas tend to be made up of different colors ranging from dark black, blue, white, or red    Any mole that demonstrates a color change should be examined promptly by a board certified dermatologist      Diameter: Healthy moles tend to be smaller than 0 6 cm in size; an exception are "congenital nevi" that can be larger  Melanomas tend to grow and can often be greater than 0 6 cm (1/4 of an inch, or the size of a pencil eraser)  This is only a guideline, and many normal moles may be larger than 0 6 cm without being unhealthy  Any mole that starts to change in size (small to bigger or bigger to smaller) should be examined promptly by a board certified dermatologist      Evolving: Healthy moles tend to "stay the same "  Melanomas may often show signs of change or evolution such as a change in size, shape, color, or elevation  Any mole that starts to itch, bleed, crust, burn, hurt, or ulcerate or demonstrate a change or evolution should be examined promptly by a board certified dermatologist       LENTIGO    Physical Exam:   Anatomic Location Affected:  Trunk, upper extremities    Morphological Description:  Light brown macules   Pertinent Positives:   Pertinent Negatives: Additional History of Present Condition:  Found on exam today  Assessment and Plan:  Based on a thorough discussion of this condition and the management approach to it (including a comprehensive discussion of the known risks, side effects and potential benefits of treatment), the patient (family) agrees to implement the following specific plan:   When outside we recommend using a wide brim hat, sunglasses, long sleeve and pants, sunscreen with SPF 00+ with reapplication every 2 hours, or SPF specific clothing     What is a lentigo? A lentigo is a pigmented flat or slightly raised lesion with a clearly defined edge  Unlike an ephelis (freckle), it does not fade in the winter months  There are several kinds of lentigo  The name lentigo originally referred to its appearance resembling a small lentil  The plural of lentigo is lentigines, although lentigos is also in common use      Who gets lentigines? Lentigines can affect males and females of all ages and races  Solar lentigines are especially prevalent in fair skinned adults  Lentigines associated with syndromes are present at birth or arise during childhood  What causes lentigines? Common forms of lentigo are due to exposure to ultraviolet radiation:   Sun damage including sunburn    Indoor tanning    Phototherapy, especially photochemotherapy (PUVA)    Ionizing radiation, eg radiation therapy, can also cause lentigines  Several familial syndromes associated with widespread lentigines originate from mutations in Jeff-MAP kinase, mTOR signaling and PTEN pathways  What is the treatment for lentigines? Most lentigines are left alone  Attempts to lighten them may not be successful  The following approaches are used:   SPF 50+ broad-spectrum sunscreen    Hydroquinone bleaching cream    Alpha hydroxy acids    Vitamin C    Retinoids    Azelaic acid    Chemical peels  Individual lesions can be permanently removed using:   Cryotherapy    Intense pulsed light    Pigment lasers    How can lentigines be prevented? Lentigines associated with exposure ultraviolet radiation can be prevented by very careful sun protection  Clothing is more successful at preventing new lentigines than are sunscreens  What is the outlook for lentigines? Lentigines usually persist  They may increase in number with age and sun exposure  Some in sun-protected sites may fade and disappear  GRAYSON ANGIOMAS    Physical Exam:   Anatomic Location Affected:  Trunk, face   Morphological Description:  Scattered cherry red, 1-4 mm papules   Pertinent Positives:   Pertinent Negatives: Additional History of Present Condition:  Found on exam today       Assessment and Plan:  Based on a thorough discussion of this condition and the management approach to it (including a comprehensive discussion of the known risks, side effects and potential benefits of treatment), the patient (family) agrees to implement the following specific plan:   Monitor for changes   Benign, reassured    Assessment and Plan:    Cherry angioma, also known as [employer] spots, are benign vascular skin lesions  A "cherry angioma" is a firm red, blue or purple papule, 0 1-1 cm in diameter  When thrombosed, they can appear black in colour until evaluated with a dermatoscope when the red or purple colour is more easily seen  Cherry angioma may develop on any part of the body but most often appear on the scalp, face, lips and trunk  An angioma is due to proliferating endothelial cells; these are the cells that line the inside of a blood vessel  Angiomas can arise in early life or later in life; the most common type of angioma is a cherry angioma  Cherry angiomas are very common in males and females of any age or race  They are more noticeable in white skin than in skin of colour  They markedly increase in number from about the age of 36  There may be a family history of similar lesions  Eruptive cherry angiomas have been rarely reported to be associated with internal malignancy  The cause of angiomas is unknown  Genetic analysis of cherry angiomas has shown that they frequently carry specific somatic missense mutations in the GNAQ and GNA11 (Q209H) genes, which are involved in other vascular and melanocytic proliferations  SEBORRHEIC KERATOSIS; NON-INFLAMED    Physical Exam:   Anatomic Location Affected:  Trunk, upper extremities, under breasts, behind left knee   Morphological Description:  Flat and raised, waxy, smooth to warty textured, yellow to brownish-grey to dark brown to blackish, discrete, "stuck-on" appearing papules   Pertinent Positives:   Pertinent Negatives:     Additional History of Present Condition:  Found on exam today    Assessment and Plan:  Based on a thorough discussion of this condition and the management approach to it (including a comprehensive discussion of the known risks, side effects and potential benefits of treatment), the patient (family) agrees to implement the following specific plan:   Monitor for changes   Benign, reassured    Seborrheic Keratosis  A seborrheic keratosis is a harmless warty spot that appears during adult life as a common sign of skin aging  Seborrheic keratoses can arise on any area of skin, covered or uncovered, with the usual exception of the palms and soles  They do not arise from mucous membranes  Seborrheic keratoses can have highly variable appearance  Seborrheic keratoses are extremely common  It has been estimated that over 90% of adults over the age of 61 years have one or more of them  They occur in males and females of all races, typically beginning to erupt in the 35s or 45s  They are uncommon under the age of 21 years  The precise cause of seborrhoeic keratoses is not known  Seborrhoeic keratoses are considered degenerative in nature  As time goes by, seborrheic keratoses tend to become more numerous  Some people inherit a tendency to develop a very large number of them; some people may have hundreds of them  There is no easy way to remove multiple lesions on a single occasion  Unless a specific lesion is "inflamed" and is causing pain or stinging/burning or is bleeding, most insurance companies do not authorize treatment  XEROSIS ("DRY SKIN")    Physical Exam:   Anatomic Location Affected:  diffuse   Morphological Description:  xerosis   Pertinent Positives:   Pertinent Negatives: Additional History of Present Condition:  Found on exam today      Assessment and Plan:  Based on a thorough discussion of this condition and the management approach to it (including a comprehensive discussion of the known risks, side effects and potential benefits of treatment), the patient (family) agrees to implement the following specific plan:   Use moisturizer like Eucerin,Cerave or Aveeno Cream 3 times a day for the dry skin               Dry skin refers to skin that feels dry to touch  Dry skin has a dull surface with a rough, scaly quality  The skin is less pliable and cracked  When dryness is severe, the skin may become inflamed and fissured  Although any body site can be dry, dry skin tends to affect the shins more than any other site  Dry skin is lacking moisture in the outer horny cell layer (stratum corneum) and this results in cracks in the skin surface  Dry skin is also called xerosis, xeroderma or asteatosis (lack of fat)  It can affect males and females of all ages  There is some racial variability in water and lipid content of the skin   Dry skin that starts in early childhood may be one of about 20 types of ichthyosis (fish-scale skin)  There is often a family history of dry skin   Dry skin is commonly seen in people with atopic dermatitis   Nearly everyone > 60 years has dry skin  Dry skin that begins later may be seen in people with certain diseases and conditions   Postmenopausal women   Hypothyroidism   Chronic renal disease    Malnutrition and weight loss    Subclinical dermatitis    Treatment with certain drugs such as oral retinoids, diuretics and epidermal growth factor receptor inhibitors      What is the treatment for dry skin? The mainstay of treatment of dry skin and ichthyosis is moisturisers/emollients  They should be applied liberally and often enough to:   Reduce itch    Improve the barrier function    Prevent entry of irritants, bacteria    Reduce transepidermal water loss  How can dry skin be prevented? Eliminate aggravating factors:   Reduce the frequency of bathing   A humidifier in winter and air conditioner in summer    Compare having a short shower with a prolonged soak in a bath   Use lukewarm, not hot, water      Replace standard soap with a substitute such as a synthetic detergent cleanser, water-miscible emollient, bath oil, anti-pruritic tar oil, colloidal oatmeal etc     Apply an emollient liberally and often, particularly shortly after bathing, and when itchy  The drier the skin, the thicker this should be, especially on the hands  What is the outlook for dry skin? A tendency to dry skin may persist life-long, or it may improve once contributing factors are controlled                    Scribe Attestation    I,:  Salvador Hardin am acting as a scribe while in the presence of the attending physician :       I,:  Elieser Benton MD personally performed the services described in this documentation    as scribed in my presence :

## 2022-01-13 ENCOUNTER — OFFICE VISIT (OUTPATIENT)
Dept: OBGYN CLINIC | Facility: OTHER | Age: 52
End: 2022-01-13
Payer: COMMERCIAL

## 2022-01-13 VITALS
DIASTOLIC BLOOD PRESSURE: 84 MMHG | HEIGHT: 66 IN | SYSTOLIC BLOOD PRESSURE: 155 MMHG | WEIGHT: 267.4 LBS | HEART RATE: 84 BPM | BODY MASS INDEX: 42.97 KG/M2

## 2022-01-13 DIAGNOSIS — M75.32 CALCIFIC TENDONITIS OF LEFT SHOULDER: Primary | ICD-10-CM

## 2022-01-13 PROCEDURE — 3008F BODY MASS INDEX DOCD: CPT | Performed by: ORTHOPAEDIC SURGERY

## 2022-01-13 PROCEDURE — 20610 DRAIN/INJ JOINT/BURSA W/O US: CPT | Performed by: ORTHOPAEDIC SURGERY

## 2022-01-13 PROCEDURE — 1036F TOBACCO NON-USER: CPT | Performed by: ORTHOPAEDIC SURGERY

## 2022-01-13 PROCEDURE — 99204 OFFICE O/P NEW MOD 45 MIN: CPT | Performed by: ORTHOPAEDIC SURGERY

## 2022-01-13 RX ORDER — BETAMETHASONE SODIUM PHOSPHATE AND BETAMETHASONE ACETATE 3; 3 MG/ML; MG/ML
6 INJECTION, SUSPENSION INTRA-ARTICULAR; INTRALESIONAL; INTRAMUSCULAR; SOFT TISSUE
Status: COMPLETED | OUTPATIENT
Start: 2022-01-13 | End: 2022-01-13

## 2022-01-13 RX ORDER — BUPIVACAINE HYDROCHLORIDE 2.5 MG/ML
2 INJECTION, SOLUTION INFILTRATION; PERINEURAL
Status: COMPLETED | OUTPATIENT
Start: 2022-01-13 | End: 2022-01-13

## 2022-01-13 RX ADMIN — BUPIVACAINE HYDROCHLORIDE 2 ML: 2.5 INJECTION, SOLUTION INFILTRATION; PERINEURAL at 08:12

## 2022-01-13 RX ADMIN — BETAMETHASONE SODIUM PHOSPHATE AND BETAMETHASONE ACETATE 6 MG: 3; 3 INJECTION, SUSPENSION INTRA-ARTICULAR; INTRALESIONAL; INTRAMUSCULAR; SOFT TISSUE at 08:12

## 2022-01-13 NOTE — PROGRESS NOTES
Assessment  Diagnoses and all orders for this visit:    Calcific tendonitis of left shoulder      Discussion and Plan:    The patient has an examination consistent with left shoulder calcific tendonitis  I have discussed with the patient the pathophysiology of this diagnosis and reviewed how the examination correlates with this diagnosis  Treatment options were discussed at length and after discussing these treatment options, the patient elected for CS injection and referral to PT  If symptoms persist we will consider lavage  Subjective:   Patient ID: Ramiro Marino is a 46 y o  female      HPI  The patient presents with a chief complaint of left shoulder pain  The pain began a few week(s) ago and is not associated with an acute injury  The patient describes the pain as aching and dull in intensity,  intermittent in timing, and localizes the pain to the  left subacromial joint  The pain is worse with movement and overuse and relieved by rest   The pain is not associated with numbness and tingling  The pain is not associated with constitutional symptoms  The patient is awoken at night by the pain  The patient has not had any treatment  The following portions of the patient's history were reviewed and updated as appropriate: allergies, current medications, past family history, past medical history, past social history, past surgical history and problem list     Review of Systems   Constitutional: Negative for chills and fever  HENT: Negative for drooling and sneezing  Eyes: Negative for redness  Respiratory: Negative for cough and wheezing  Gastrointestinal: Negative for nausea and vomiting  Musculoskeletal:        Please see ortho exam   Psychiatric/Behavioral: Negative for behavioral problems  The patient is not nervous/anxious          Objective:  /84   Pulse 84   Ht 5' 6" (1 676 m)   Wt 121 kg (267 lb 6 4 oz)   BMI 43 16 kg/m²       Left Shoulder Exam     Tenderness   The patient is experiencing no tenderness  Range of Motion   Passive abduction: 90   External rotation: 70   Forward flexion: 170   Internal rotation 0 degrees: Lumbar     Muscle Strength   Abduction: 4/5   External rotation: 5/5     Tests   Andino test: positive  Impingement: positive    Other   Erythema: absent  Sensation: normal  Pulse: present             Physical Exam  Vitals reviewed  Constitutional:       Appearance: She is well-developed  Eyes:      Pupils: Pupils are equal, round, and reactive to light  Pulmonary:      Effort: Pulmonary effort is normal       Breath sounds: Normal breath sounds  Skin:     General: Skin is warm and dry  Neurological:      Mental Status: She is alert and oriented to person, place, and time  Psychiatric:         Behavior: Behavior normal          Thought Content: Thought content normal          Judgment: Judgment normal          Large joint arthrocentesis: L subacromial bursa  Universal Protocol:  Consent given by: patient  Patient understanding: patient states understanding of the procedure being performed  Site marked: the operative site was marked  Patient identity confirmed: verbally with patient    Supporting Documentation  Indications: pain   Procedure Details  Location: shoulder - L subacromial bursa  Needle size: 22 G  Ultrasound guidance: no  Approach: lateral  Medications administered: 2 mL bupivacaine 0 25 %; 6 mg betamethasone acetate-betamethasone sodium phosphate 6 (3-3) mg/mL    Patient tolerance: patient tolerated the procedure well with no immediate complications  Dressing:  Sterile dressing applied        I have personally reviewed pertinent films in PACS and my interpretation is as follows    Left shoulder x-rays demonstrates calcific tendonitis, no fracture or dislocation    Scribe Attestation    I,:  Arby Galeazzi am acting as a scribe while in the presence of the attending physician :       I,:  Yue Evans MD personally performed the services described in this documentation    as scribed in my presence :

## 2022-01-13 NOTE — PATIENT INSTRUCTIONS

## 2022-01-20 ENCOUNTER — TELEPHONE (OUTPATIENT)
Dept: FAMILY MEDICINE CLINIC | Facility: CLINIC | Age: 52
End: 2022-01-20

## 2022-01-27 NOTE — PROGRESS NOTES
PT Evaluation     Today's date: 2022  Patient name: Sarah Comer  : 1970  MRN: [id number]  Referring provider: Shaggy Tyler*  Dx:   Encounter Diagnosis     ICD-10-CM    1  Calcific tendonitis of left shoulder  M75 32                   Assessment  Assessment details: Sarah Comer is a 46 y o  female who presents with signs and symptoms consistent of calcific tendonitis of the L shoulder  Patient recently received cortisone injection which decreased symptoms  Patient's remaining impairments include decreased ROM into abduction and flexion as well as decreased strength and pain with rotational movements  Due to these impairments, patient has difficulty performing ADLs such as reaching overhead to put away dishes, carrying heavy things, or quick movements  Patient would benefit from skilled physical therapy to address the impairments, improve their level of function, and to improve their overall quality of life  Impairments: abnormal or restricted ROM, abnormal movement, activity intolerance and pain with function  Understanding of Dx/Px/POC: good   Prognosis: good  Prognosis details: Positive prognostic indicators include: absence of observed red flags, positive attitude towards recovery, good understanding of diagnosis and treatment plan       Goals  Short Term Goals: to be achieved by 4 weeks  1) Patient to be independent with basic HEP  2) Decrease pain to 2/10 at its worst   3) Increase shoulder ROM by 5-10 degrees in all planes  4) Increase shoulder strength by 1/2 MMT grade in all deficient planes  Long Term Goals: to be achieved by discharge  1) FOTO equal to or greater than expected  2) Patient to be independent with comprehensive HEP  3) Patient will demonstrate maximal over head reaching  4) Increase UE strength to 5/5 MMT grade in all planes to improve a/iadls  5) Patient to report no sleep interruption secondary to pain      Plan  Patient would benefit from: skilled physical therapy  Planned therapy interventions: manual therapy, neuromuscular re-education, patient education, therapeutic exercise, therapeutic activities and home exercise program  Frequency: 2x/week for 4-6 weeks  Treatment plan discussed with: patient        Subjective Evaluation    History of Present Illness  Mechanism of injury: History: Pt reports left shoulder pain that began around [place] without any PASCUAL  Pt saw a chiropractor and then saw Dr Himanshu Joel who gave an injection that helped  Pt is right handed  Aggravating: end ranges of motion, reaching out to the side and behind the back, lifting heavy  Alleviating: heat at night  24 hours: stiff in the morning, loosens up as the day goes on   Functional limitations: none   Hobbies/occupation: teach middle school art   Imagin/21 xray shows calcific tendonitis   Red flags: Rosio Dan denies red flags  Goals: manage the pain, improve mobility   Pain  Current pain ratin  At best pain ratin  At worst pain ratin  Location: lateral [facility]   Quality: sharp          Objective     Active Range of Motion   Left Shoulder   Flexion: 130 degrees   Abduction: 74 degrees   External rotation 0°: 72 degrees     Right Shoulder   Flexion: 170 degrees   Abduction: 155 degrees   External rotation 0°: 70 degrees     Additional Active Range of Motion Details  Behind the back T6 B and overhead T3 bilateral     Passive Range of Motion   Left Shoulder   Flexion: 135 degrees   Abduction: 88 degrees   External rotation 45°: 40 degrees   Internal rotation 0°: 40 degrees     Right Shoulder   Normal passive range of motion    Joint Play   Left Shoulder  Hypomobile in the posterior capsule  Right Shoulder  Hypomobile in the posterior capsule  Strength/Myotome Testing     Left Shoulder     Planes of Motion   Flexion: 4 (pain)   Left shoulder abduction strength: NT pain     External rotation at 0°: 5   Internal rotation at 0°: 5     Right Shoulder     Planes of Motion Flexion: 5   Abduction: 5   External rotation at 0°: 5   Internal rotation at 0°: 5              Precautions: HTN, obesity, Parkinsons       Manuals 1/28            PROM as tolerated ADOLFO                                                   Neuro Re-Ed             Bent over row              Bent over [facility] extension             Straight arm pull down                                                                  Ther Ex             Sidelying ER  x20            Scap squeeze x20            Rows  NV           Wall crawl Flex x5            UBE                                                    Ther Activity                                       Gait Training                                       Modalities

## 2022-01-28 ENCOUNTER — EVALUATION (OUTPATIENT)
Dept: PHYSICAL THERAPY | Facility: CLINIC | Age: 52
End: 2022-01-28
Payer: COMMERCIAL

## 2022-01-28 DIAGNOSIS — M75.32 CALCIFIC TENDONITIS OF LEFT SHOULDER: Primary | ICD-10-CM

## 2022-01-28 PROCEDURE — 97161 PT EVAL LOW COMPLEX 20 MIN: CPT

## 2022-01-28 PROCEDURE — 97110 THERAPEUTIC EXERCISES: CPT

## 2022-01-28 PROCEDURE — 97140 MANUAL THERAPY 1/> REGIONS: CPT

## 2022-01-31 ENCOUNTER — OFFICE VISIT (OUTPATIENT)
Dept: PHYSICAL THERAPY | Facility: CLINIC | Age: 52
End: 2022-01-31
Payer: COMMERCIAL

## 2022-01-31 DIAGNOSIS — M75.32 CALCIFIC TENDONITIS OF LEFT SHOULDER: Primary | ICD-10-CM

## 2022-01-31 PROCEDURE — 97112 NEUROMUSCULAR REEDUCATION: CPT

## 2022-01-31 PROCEDURE — 97140 MANUAL THERAPY 1/> REGIONS: CPT

## 2022-01-31 PROCEDURE — 97110 THERAPEUTIC EXERCISES: CPT

## 2022-01-31 NOTE — PROGRESS NOTES
Daily Note     Today's date: 2022  Patient name: Trent Sahu  : 1970  MRN: [id number]  Referring provider: Umm Loera*  Dx:   Encounter Diagnosis     ICD-10-CM    1  Calcific tendonitis of left shoulder  M75 32                   Subjective: Patient reports that her shoulder really loosened up after the IE       Objective: See treatment diary below      Assessment: Pt presents with significantly improved ROM compared to initial evaluation and was able to achieve 90% full range with pulley AAROM warm-up  Pt was able to progress to strengthening exercises including TB IR/ER, rows, and scap strengthening exercises  Pt had no pain at the end of the session and felt better than when she walked in  Will assess pt's response next visit and adjust POC and HEP as necessary  Plan: Continue per plan of care        Precautions: HTN, obesity, Parkinsons      1:1 615-655  Manuals            PROM as tolerated Virgilio Fischered                                                  Neuro Re-Ed             Bent over row              Bent over [address] extension             Straight arm pull down   10# x30           Scap clocks  YTB 2x5                                                  Ther Ex             Sidelying ER  x20            Scap squeeze x20            Rows  Josh 10# 3x10           Wall crawl Flex x5            UBE  3'/3'           Pulley   2' flex, scap, abd            TB ER / IR  3x10 RTB/GTB           Pendulum   x20                                     Ther Activity                                       Gait Training                                       Modalities

## 2022-02-03 ENCOUNTER — OFFICE VISIT (OUTPATIENT)
Dept: PHYSICAL THERAPY | Facility: CLINIC | Age: 52
End: 2022-02-03
Payer: COMMERCIAL

## 2022-02-03 DIAGNOSIS — M75.32 CALCIFIC TENDONITIS OF LEFT SHOULDER: Primary | ICD-10-CM

## 2022-02-03 PROCEDURE — 97112 NEUROMUSCULAR REEDUCATION: CPT

## 2022-02-03 PROCEDURE — 97110 THERAPEUTIC EXERCISES: CPT

## 2022-02-03 PROCEDURE — 97140 MANUAL THERAPY 1/> REGIONS: CPT

## 2022-02-03 NOTE — PROGRESS NOTES
Daily Note     Today's date: 2/3/2022  Patient name: Tracey Self  : 1970  MRN: [id number]  Referring provider: Yoandy Christianson*  Dx:   Encounter Diagnosis     ICD-10-CM    1  Calcific tendonitis of left shoulder  M75 32                   Subjective: Patient reports that her shoulder was a little sore after last session       Objective: See treatment diary below      Assessment: Patient tolerated treatment well today with improvement in flexion range of motion to 85% range  Continued with ROM and periscap/RTC strength to improve overhead function  Patient is making good progress and should have great success with rehab  Plan: Continue per plan of care        Precautions: HTN, obesity, Parkinsons      1:1 5-545   Manuals 1/28 1/31 2/3          PROM as tolerated [address] over row    2# 3x10          Bent over [address] extension   3x10           Straight arm pull down   10# x30 10# 3x20          Scap clocks  YTB 2x5                                                  Ther Ex             Sidelying ER  x20            Scap squeeze x20            Rows  [place] 10# 3x10 [place] 10# 3x10           Wall crawl Flex x5            UBE  3'/3' 3'/3'          Pulley   2' flex, scap, abd  2' flex, scap, abd           TB ER / IR  3x10 RTB/GTB 3x10 RTB/GTB          Pendulum   x20           Cane AAROM   X10 flex, ER                        Ther Activity                                       Gait Training                                       Modalities

## 2022-02-07 ENCOUNTER — OFFICE VISIT (OUTPATIENT)
Dept: PHYSICAL THERAPY | Facility: CLINIC | Age: 52
End: 2022-02-07
Payer: COMMERCIAL

## 2022-02-07 DIAGNOSIS — M75.32 CALCIFIC TENDONITIS OF LEFT SHOULDER: Primary | ICD-10-CM

## 2022-02-07 PROCEDURE — 97110 THERAPEUTIC EXERCISES: CPT

## 2022-02-07 PROCEDURE — 97112 NEUROMUSCULAR REEDUCATION: CPT

## 2022-02-07 PROCEDURE — 97140 MANUAL THERAPY 1/> REGIONS: CPT

## 2022-02-07 NOTE — PROGRESS NOTES
Daily Note     Today's date: 2022  Patient name: Ashley Holt  : 1970  MRN: [id number]  Referring provider: Zackary Eng*  Dx:   Encounter Diagnosis     ICD-10-CM    1  Calcific tendonitis of left shoulder  M75 32                   Subjective: Patient reports no sleeping difficulties and just minor soreness after PT but mostly fatigue  Feels about 80% back to desired level of function  Objective: See treatment diary below      Assessment: Initiated rhythmic stabilization/perturbation exercises today as pt still has complaints of pain with quick movements  Pt had difficulty coordinating body blade as it was her non-dominant hand but tolerated all other exercises well with moderate fatigue by the end of session  Pt stil limited in PROM all directions but is making good progres  s       Plan: Continue per plan of care        Precautions: HTN, obesity, Parkinsons      1:1 330-415  Manuals 1/28 1/31 2/3 2/7         PROM as tolerated Anibal Gens         Rhythmic stab     4x30s                                   Neuro Re-Ed             Bent over row    2# 3x10          Bent over [address] extension   3x10           Straight arm pull down   10# x30 10# 3x20          Scap clocks  YTB 2x5  YTB 2x5         Standing single arm punchouts    RTB 3x10         Body blade    A/P 3x30s                       Ther Ex             Sidelying ER  x20            Scap squeeze x20            Rows  Josh 10# 3x10 Josh 10# 3x10           Wall crawl Flex x5            UBE  3'/3' 3'/3' 3'/3'         Pulley   2' flex, scap, abd  2' flex, scap, abd           TB ER / IR  3x10 RTB/GTB 3x10 RTB/GTB 3x10 GTB          Pendulum   x20  x20         Cane AAROM   X10 flex, ER                        Ther Activity             Ball circles on wall    3x30 CW/CCW                      Gait Training                                       Modalities

## 2022-02-09 ENCOUNTER — OFFICE VISIT (OUTPATIENT)
Dept: PHYSICAL THERAPY | Facility: CLINIC | Age: 52
End: 2022-02-09
Payer: COMMERCIAL

## 2022-02-09 DIAGNOSIS — M75.32 CALCIFIC TENDONITIS OF LEFT SHOULDER: Primary | ICD-10-CM

## 2022-02-09 PROCEDURE — 97110 THERAPEUTIC EXERCISES: CPT

## 2022-02-09 PROCEDURE — 97140 MANUAL THERAPY 1/> REGIONS: CPT

## 2022-02-09 PROCEDURE — 97112 NEUROMUSCULAR REEDUCATION: CPT

## 2022-02-09 NOTE — PROGRESS NOTES
PT Discharge     Today's date: 2022  Patient name: Shalom Robbins  : 1970  MRN: [id number]  Referring provider: Abel Ye  Dx:   Encounter Diagnosis     ICD-10-CM    1  Calcific tendonitis of left shoulder  M75 32                   Subjective: Patient reports her shoulder felt good after last session and has no complaints  Feels ready for discharge  Worst pain <3/10  Objective: Active Range of Motion   Left Shoulder   Flexion: 130 degrees -- now 152  Abduction: 74 degrees -- now 150  External rotation 0°: 72 degrees -- 82 degrees        Additional Active Range of Motion Details  Behind the back T6 B and overhead T3 bilateral     Passive Range of Motion   Left Shoulder   Flexion: 135 degrees -- now 145   Abduction: 88 degrees - now 88 to 95   External rotation 45°: 40 degrees -- now 60  Internal rotation 0°: 40 degrees -- now 80    Right Shoulder   Normal passive range of motion    Joint Play   Left Shoulder  Hypomobile in the posterior capsule  Right Shoulder  Hypomobile in the posterior capsule  Strength/Myotome Testing     Left Shoulder     Planes of Motion   Flexion: 4 (pain) -- now 4+ no pain   Left shoulder abduction strength: NT pain  -- now 4   External rotation at 0°: 5   Internal rotation at 0°: 5     Right Shoulder     Planes of Motion   Flexion: 5   Abduction: 5   External rotation at 0°: 5   Internal rotation at 0°: 5         Assessment: Patient reports perceived improvement of 90%  Functional outcome status measure improved from 46 to 76 at last taking  Pain levels have reduced to 3/10 at worst and 0/10 at best  Pt still has difficulty with end range motion but has made steady progress and will continue to make progress with HEP focusing on stretching  Patient is able to functionally complete all necessary ADLs and is discharged to [facility]  Plan: Continue per plan of care        Precautions: HTN, obesity, Parkinsons      1:1 with PT 5-545  Manuals  2/3 2/7 2/9        PROM as tolerated ADOLFO ADOLFO ADOLFO ADOLFO ADOLFO        Rhythmic stab     4x30s                                   Neuro Re-Ed             Bent over row    2# 3x10  5# 3x10        Bent over [facility] extension   3x10           Straight arm pull down   10# x30 10# 3x20          Scap clocks  YTB 2x5  YTB 2x5         Standing single arm punchouts    RTB 3x10         Body blade    A/P 3x30s  A/P 3x30"                     Ther Ex             Sidelying ER  x20            Scap squeeze x20            Rows  Josh 10# 3x10 [place] 10# 3x10           Wall crawl Flex x5            UBE  3'/3' 3'/3' 3'/3' 3'/3'        Pulley   2' flex, scap, abd  2' flex, scap, abd           TB ER / IR  3x10 RTB/GTB 3x10 RTB/GTB 3x10 GTB          Pendulum   x20  x20         Cane AAROM   X10 flex, ER   x10 flex, ER                      Ther Activity             Ball circles on wall    3x30 CW/CCW 3x30 CW/CCW        Circuit     RTB punchouts fwd, OH punchesk horiz abd 3x10        Gait Training                                       Modalities

## 2022-02-16 ENCOUNTER — HOSPITAL ENCOUNTER (OUTPATIENT)
Dept: RADIOLOGY | Age: 52
Discharge: HOME | End: 2022-02-16
Payer: COMMERCIAL

## 2022-02-16 VITALS — HEIGHT: 66 IN | BODY MASS INDEX: 42.59 KG/M2 | WEIGHT: 265 LBS

## 2022-02-16 DIAGNOSIS — Z12.31 ENCOUNTER FOR SCREENING MAMMOGRAM FOR MALIGNANT NEOPLASM OF BREAST: ICD-10-CM

## 2022-02-16 PROCEDURE — 77067 SCR MAMMO BI INCL CAD: CPT

## 2022-02-16 PROCEDURE — 77063 BREAST TOMOSYNTHESIS BI: CPT

## 2022-11-29 ENCOUNTER — ANNUAL EXAM (OUTPATIENT)
Dept: OBGYN CLINIC | Facility: MEDICAL CENTER | Age: 52
End: 2022-11-29

## 2022-11-29 VITALS
BODY MASS INDEX: 43.01 KG/M2 | SYSTOLIC BLOOD PRESSURE: 134 MMHG | WEIGHT: 267.6 LBS | DIASTOLIC BLOOD PRESSURE: 86 MMHG | HEIGHT: 66 IN

## 2022-11-29 DIAGNOSIS — Z12.31 ENCOUNTER FOR SCREENING MAMMOGRAM FOR BREAST CANCER: ICD-10-CM

## 2022-11-29 DIAGNOSIS — Z01.419 ENCOUNTER FOR GYNECOLOGICAL EXAMINATION (GENERAL) (ROUTINE) WITHOUT ABNORMAL FINDINGS: Primary | ICD-10-CM

## 2022-11-29 NOTE — PATIENT INSTRUCTIONS
Calcium 1000 mg + 600-1000 IU Vit D daily  Exercise 150 minutes per week minimum including weight bearing exercises  Weight loss encouraged  Pap with high risk HPV Q 5 years, if normal   Due 2026  Annual mammogram previously ordered and monthly breast self exam recommended  Colonoscopy- due 10/23      Kegels 20 times twice daily  Silicone based lubricant with sex  (Use water based lubricant with condoms or sexual toys )    Vaginal moisturizers twice weekly as needed  Return to office in one year or sooner, if needed

## 2022-11-29 NOTE — PROGRESS NOTES
Assessment/Plan:  Calcium 1000 mg + 600-1000 IU Vit D daily  Exercise 150 minutes per week minimum including weight bearing exercises  Weight loss encouraged  Pap with high risk HPV Q 5 years, if normal   Due   Annual mammogram previously ordered and monthly breast self exam recommended  Colonoscopy- due 10/23      Kegels 20 times twice daily  Silicone based lubricant with sex  (Use water based lubricant with condoms or sexual toys )    Vaginal moisturizers twice weekly as needed  Return to office in one year or sooner, if needed  1  Encounter for gynecological examination (general) (routine) without abnormal findings    2  Encounter for screening mammogram for breast cancer           Subjective:      Patient ID: Sam Cline is a 45 y o  female  HPI    Sam Cline is a 45 y o   ( 31 yo boy, 24 yo boy, they have a girl dog)  female who is here today for her annual visit  Diagnosis of parkinsons in   Medication has improved symptoms  Menses approx Q 4 months x 1-5 days with light flow  Menses is acceptable  Exercise- walks dog nightly, does parkinsons boxing twice weekly at [place]  Works PT as a teacher (every week day)  Teaches at [employer]  Sam Cline is sexually active with male partner/  of 28 years  Monogamous and feels safe in this relationship  Denies vaginal pain,bleeding or dryness  She does not use contraception  She is not interested in STD screening today  She denies vaginal discharge, itching or pelvic pain  She has no urinary concerns, does not have incontinence  No bowel concerns  No breast concerns  Last pap: 21 normal pap with negative HR HPV, 16 normal pap with negative HR HPV  DEXA scan: N/A  Mammogram: 22 normal   Colonoscopy: 10/27/20 negative cologard      The following portions of the patient's history were reviewed and updated as appropriate: allergies, current medications, past family history, past medical history, past social history, past surgical history and problem list     Review of Systems   Constitutional: Negative  Negative for activity change, appetite change, chills, diaphoresis, fatigue, fever and unexpected weight change  HENT: Negative for congestion, dental problem, sneezing, sore throat and trouble swallowing  Eyes: Negative for visual disturbance  Respiratory: Negative for chest tightness and shortness of breath  Cardiovascular: Negative for chest pain and leg swelling  Gastrointestinal: Negative for abdominal pain, constipation, diarrhea, nausea and vomiting  Genitourinary: Positive for menstrual problem  Negative for difficulty urinating, dyspareunia, dysuria, frequency, hematuria, pelvic pain, urgency, vaginal bleeding, vaginal discharge and vaginal pain  Musculoskeletal: Negative for back pain and neck pain  Skin: Negative  Allergic/Immunologic: Negative  Neurological: Negative for weakness and headaches  Hematological: Negative for adenopathy  Psychiatric/Behavioral: Negative  Objective:      /86 (BP Location: Left arm, Patient Position: Sitting, Cuff Size: Standard)   Ht 5' 6" (1 676 m)   Wt 121 kg (267 lb 9 6 oz)   LMP 10/19/2022 (Approximate)   BMI 43 19 kg/m²          Physical Exam  Vitals and nursing note reviewed  Constitutional:       Appearance: Normal appearance  She is well-developed  She is obese  HENT:      Head: Normocephalic and atraumatic  Eyes:      General:         Right eye: No discharge  Left eye: No discharge  Neck:      Thyroid: No thyromegaly  Trachea: Trachea normal    Cardiovascular:      Rate and Rhythm: Normal rate and regular rhythm  Heart sounds: Normal heart sounds  Pulmonary:      Effort: Pulmonary effort is normal       Breath sounds: Normal breath sounds     Chest:   Breasts:     Breasts are symmetrical       Right: Normal  No inverted nipple, mass, nipple discharge, skin change or tenderness  Left: Normal  No inverted nipple, mass, nipple discharge, skin change or tenderness  Abdominal:      Palpations: Abdomen is soft  Genitourinary:     General: Normal vulva  Exam position: Lithotomy position  Labia:         Right: No rash, tenderness, lesion or injury  Left: No rash, tenderness, lesion or injury  Urethra: No prolapse, urethral pain, urethral swelling or urethral lesion  Vagina: Normal  No signs of injury and foreign body  No vaginal discharge, erythema, tenderness or bleeding  Cervix: Normal       Uterus: Normal        Adnexa:         Right: No mass, tenderness or fullness  Left: No mass, tenderness or fullness  Rectum: No external hemorrhoid  Comments: Small sebaceous cyst, non tender  Physical exam limited by habitus  Musculoskeletal:         General: Normal range of motion  Cervical back: Normal range of motion and neck supple  Lymphadenopathy:      Head:      Right side of head: No submental, submandibular or tonsillar adenopathy  Left side of head: No submental, submandibular or tonsillar adenopathy  Cervical: No cervical adenopathy  Upper Body:      Right upper body: No supraclavicular or axillary adenopathy  Left upper body: No supraclavicular or axillary adenopathy  Lower Body: No right inguinal adenopathy  No left inguinal adenopathy  Skin:     General: Skin is warm and dry  Neurological:      Mental Status: She is alert and oriented to person, place, and time     Psychiatric:         Mood and Affect: Mood normal          Behavior: Behavior normal

## 2022-12-07 ENCOUNTER — OFFICE VISIT (OUTPATIENT)
Dept: FAMILY MEDICINE CLINIC | Facility: CLINIC | Age: 52
End: 2022-12-07

## 2022-12-07 VITALS
OXYGEN SATURATION: 96 % | WEIGHT: 271 LBS | BODY MASS INDEX: 43.55 KG/M2 | HEIGHT: 66 IN | HEART RATE: 82 BPM | DIASTOLIC BLOOD PRESSURE: 86 MMHG | SYSTOLIC BLOOD PRESSURE: 132 MMHG | TEMPERATURE: 97.1 F

## 2022-12-07 DIAGNOSIS — G20 PARKINSON'S DISEASE (HCC): ICD-10-CM

## 2022-12-07 DIAGNOSIS — R68.89 HEAT INTOLERANCE: ICD-10-CM

## 2022-12-07 DIAGNOSIS — E66.01 MORBID OBESITY DUE TO EXCESS CALORIES (HCC): ICD-10-CM

## 2022-12-07 DIAGNOSIS — Z23 ENCOUNTER FOR IMMUNIZATION: ICD-10-CM

## 2022-12-07 DIAGNOSIS — Z00.00 ANNUAL PHYSICAL EXAM: Primary | ICD-10-CM

## 2022-12-07 PROBLEM — G20.A1 PARKINSON'S DISEASE: Status: ACTIVE | Noted: 2022-12-07

## 2022-12-07 NOTE — PROGRESS NOTES
[facility]    NAME: Soniya Pérez  AGE: 45 y o  SEX: female  : 1970     DATE: 2022     Assessment and Plan:     Problem List Items Addressed This Visit        Nervous and Auditory    Parkinson's disease ([facility] [address] )       Other    Morbid obesity due to excess calories ([facility] [address] )    Relevant Orders    CBC and differential    Comprehensive metabolic panel    Lipid panel   Other Visit Diagnoses     Annual physical exam    -  Primary    Heat intolerance        Relevant Orders    TSH, 3rd generation with Free T4 reflex    Encounter for immunization        Relevant Orders    influenza vaccine, quadrivalent, recombinant, PF, 0 5 mL, for patients 18 yr+ (FLUBLOK) (Completed)      Continue following with neurology  Orders as above  Up-to-date with screenings  Flu shot today, will come back for shingles vaccine    Immunizations and preventive care screenings were discussed with patient today  Appropriate education was printed on patient's after visit summary  Counseling:  Alcohol/drug use: discussed moderation in alcohol intake, the recommendations for healthy alcohol use, and avoidance of illicit drug use  Dental Health: discussed importance of regular tooth brushing, flossing, and dental visits  Injury prevention: discussed safety/seat belts, safety helmets, smoke detectors, carbon dioxide detectors, and smoking near bedding or upholstery  Sexual health: discussed sexually transmitted diseases, partner selection, use of condoms, avoidance of unintended pregnancy, and contraceptive alternatives  Exercise: the importance of regular exercise/physical activity was discussed  Recommend exercise 3-5 times per week for at least 30 minutes  BMI Counseling: Body mass index is 43 74 kg/m²  The BMI is above normal  Nutrition recommendations include decreasing portion sizes, reducing intake of saturated and trans fat and reducing intake of cholesterol  Exercise recommendations include moderate physical activity 150 minutes/week  No pharmacotherapy was ordered  Patient referred to PCP  Rationale for BMI follow-up plan is due to patient being overweight or obese  Depression Screening and Follow-up Plan: Patient was screened for depression during today's encounter  They screened negative with a PHQ-2 score of 0  Return in about 1 year (around 12/7/2023) for Annual physical      Chief Complaint:     Chief Complaint   Patient presents with   • Physical Exam      History of Present Illness:     Adult Annual Physical   Patient here for a comprehensive physical exam  The patient reports no problems  Diet and Physical Activity  Diet/Nutrition: consuming 3-5 servings of fruits/vegetables daily and Med diet  Exercise: 1-2 times a week on average  Depression Screening  PHQ-2/9 Depression Screening    Little interest or pleasure in doing things: 0 - not at all  Feeling down, depressed, or hopeless: 0 - not at all  PHQ-2 Score: 0  PHQ-2 Interpretation: Negative depression screen       General Health  Sleep: gets 7-8 hours of sleep on average  Hearing: normal - bilateral   Vision: readers  Dental: regular dental visits  /GYN Health  Patient is: premenopausal  Last menstrual period: Irregular     Review of Systems:     Review of Systems   Constitutional: Negative for fatigue and fever  HENT: Negative for sore throat  Eyes: Negative for visual disturbance  Respiratory: Negative for cough, chest tightness and shortness of breath  Cardiovascular: Negative for chest pain, palpitations and leg swelling  Gastrointestinal: Negative for abdominal pain, constipation, diarrhea and nausea  Endocrine: Negative for cold intolerance and heat intolerance  Genitourinary: Negative for flank pain  Musculoskeletal: Negative for back pain and neck pain  Skin: Negative for rash  Neurological: Negative for headaches     Psychiatric/Behavioral: Negative for behavioral problems and confusion  Past Medical History:     Past Medical History:   Diagnosis Date   • Acne    • Arthritis    • Parkinson disease ([address] )    • Varicella       Past Surgical History:     History reviewed  No pertinent surgical history     Social History:     Social History     Socioeconomic History   • Marital status: /Civil Union     Spouse name: None   • Number of children: None   • Years of education: None   • Highest education level: None   Occupational History   • None   Tobacco Use   • Smoking status: Never   • Smokeless tobacco: Never   Vaping Use   • Vaping Use: Never used   Substance and Sexual Activity   • Alcohol use: Yes     Comment: social   • Drug use: No   • Sexual activity: Yes     Partners: Male     Birth control/protection: Female Sterilization     Comment: tubal   Other Topics Concern   • None   Social History Narrative   • None     Social Determinants of Health     Financial Resource Strain: Not on file   Food Insecurity: Not on file   Transportation Needs: Not on file   Physical Activity: Not on file   Stress: Not on file   Social Connections: Not on file   Intimate Partner Violence: Not on file   Housing Stability: Not on file      Family History:     Family History   Problem Relation Age of Onset   • Cancer Mother         gall bladder ca   • Heart disease Father    • Breast cancer Neg Hx    • Ovarian cancer Neg Hx    • Colon cancer Neg Hx       Current Medications:     Current Outpatient Medications   Medication Sig Dispense Refill   • ascorbic acid (VITAMIN C) 500 mg tablet Take 500 mg by mouth daily     • carbidopa-levodopa (SINEMET)  mg per tablet Take 1 tablet by mouth Three times a day     • glucosamine-chondroitin 500-400 MG tablet Take 1 tablet by mouth 3 (three) times a day     • Multiple Vitamin (DAILY VALUE MULTIVITAMIN) TABS Take 1 tablet by mouth daily     • Omega-3 Fatty Acids (FISH OIL) 1,000 mg Take 1,000 mg by mouth daily       No current facility-administered medications for this visit  Allergies:     No Known Allergies   Physical Exam:     /86 (BP Location: Left arm, Patient Position: Sitting, Cuff Size: Large)   Pulse 82   Temp (!) 97 1 °F (36 2 °C)   Ht 5' 6" (1 676 m)   Wt 123 kg (271 lb)   SpO2 96%   BMI 43 74 kg/m²     Physical Exam  Vitals and nursing note reviewed  Constitutional:       General: She is not in acute distress  Appearance: Normal appearance  She is well-developed  HENT:      Head: Normocephalic and atraumatic  Nose: Nose normal    Eyes:      Extraocular Movements: Extraocular movements intact  Conjunctiva/sclera: Conjunctivae normal       Pupils: Pupils are equal, round, and reactive to light  Cardiovascular:      Rate and Rhythm: Normal rate and regular rhythm  Heart sounds: Normal heart sounds  Pulmonary:      Effort: Pulmonary effort is normal       Breath sounds: Normal breath sounds  Abdominal:      General: Bowel sounds are normal       Palpations: Abdomen is soft  Musculoskeletal:         General: Normal range of motion  Cervical back: Normal range of motion  Skin:     General: Skin is warm and dry  Neurological:      General: No focal deficit present  Mental Status: She is alert and oriented to person, place, and time     Psychiatric:         Mood and Affect: Mood normal          Speech: Speech normal          Behavior: Behavior normal           Kemar Araya MD  [address]

## 2022-12-22 ENCOUNTER — CLINICAL SUPPORT (OUTPATIENT)
Dept: FAMILY MEDICINE CLINIC | Facility: CLINIC | Age: 52
End: 2022-12-22

## 2022-12-22 DIAGNOSIS — Z23 ENCOUNTER FOR IMMUNIZATION: Primary | ICD-10-CM

## 2023-01-16 LAB
ALBUMIN SERPL-MCNC: 4.1 G/DL (ref 3.6–5.1)
ALBUMIN/GLOB SERPL: 1.4 (CALC) (ref 1–2.5)
ALP SERPL-CCNC: 82 U/L (ref 37–153)
ALT SERPL-CCNC: 17 U/L (ref 6–29)
AST SERPL-CCNC: 17 U/L (ref 10–35)
BASOPHILS # BLD AUTO: 68 CELLS/UL (ref 0–200)
BASOPHILS NFR BLD AUTO: 0.9 %
BILIRUB SERPL-MCNC: 0.7 MG/DL (ref 0.2–1.2)
BUN SERPL-MCNC: 14 MG/DL (ref 7–25)
BUN/CREAT SERPL: ABNORMAL (CALC) (ref 6–22)
CALCIUM SERPL-MCNC: 9.2 MG/DL (ref 8.6–10.4)
CHLORIDE SERPL-SCNC: 99 MMOL/L (ref 98–110)
CHOLEST SERPL-MCNC: 205 MG/DL
CHOLEST/HDLC SERPL: 3.5 (CALC)
CO2 SERPL-SCNC: 35 MMOL/L (ref 20–32)
CREAT SERPL-MCNC: 0.7 MG/DL (ref 0.5–1.03)
EOSINOPHIL # BLD AUTO: 167 CELLS/UL (ref 15–500)
EOSINOPHIL NFR BLD AUTO: 2.2 %
ERYTHROCYTE [DISTWIDTH] IN BLOOD BY AUTOMATED COUNT: 12 % (ref 11–15)
GFR/BSA.PRED SERPLBLD CYS-BASED-ARV: 103 ML/MIN/1.73M2
GLOBULIN SER CALC-MCNC: 3 G/DL (CALC) (ref 1.9–3.7)
GLUCOSE SERPL-MCNC: 93 MG/DL (ref 65–99)
HCT VFR BLD AUTO: 43.9 % (ref 35–45)
HDLC SERPL-MCNC: 58 MG/DL
HGB BLD-MCNC: 14 G/DL (ref 11.7–15.5)
LDLC SERPL CALC-MCNC: 113 MG/DL (CALC)
LYMPHOCYTES # BLD AUTO: 3086 CELLS/UL (ref 850–3900)
LYMPHOCYTES NFR BLD AUTO: 40.6 %
MCH RBC QN AUTO: 28.3 PG (ref 27–33)
MCHC RBC AUTO-ENTMCNC: 31.9 G/DL (ref 32–36)
MCV RBC AUTO: 88.9 FL (ref 80–100)
MONOCYTES # BLD AUTO: 646 CELLS/UL (ref 200–950)
MONOCYTES NFR BLD AUTO: 8.5 %
NEUTROPHILS # BLD AUTO: 3633 CELLS/UL (ref 1500–7800)
NEUTROPHILS NFR BLD AUTO: 47.8 %
NONHDLC SERPL-MCNC: 147 MG/DL (CALC)
PLATELET # BLD AUTO: 304 THOUSAND/UL (ref 140–400)
PMV BLD REES-ECKER: 11.6 FL (ref 7.5–12.5)
POTASSIUM SERPL-SCNC: 4 MMOL/L (ref 3.5–5.3)
PROT SERPL-MCNC: 7.1 G/DL (ref 6.1–8.1)
RBC # BLD AUTO: 4.94 MILLION/UL (ref 3.8–5.1)
SODIUM SERPL-SCNC: 137 MMOL/L (ref 135–146)
TRIGL SERPL-MCNC: 219 MG/DL
WBC # BLD AUTO: 7.6 THOUSAND/UL (ref 3.8–10.8)

## 2023-01-17 ENCOUNTER — TELEPHONE (OUTPATIENT)
Dept: FAMILY MEDICINE CLINIC | Facility: CLINIC | Age: 53
End: 2023-01-17

## 2023-01-17 NOTE — TELEPHONE ENCOUNTER
----- Message from Dulce Vergara MD sent at 1/17/2023  9:50 AM EST -----  Insert results  Total cholesterol elevated  This can be managed with diet  Decrease processed foods and saturated fats

## 2023-01-31 ENCOUNTER — OFFICE VISIT (OUTPATIENT)
Dept: FAMILY MEDICINE CLINIC | Facility: CLINIC | Age: 53
End: 2023-01-31

## 2023-01-31 VITALS
HEIGHT: 66 IN | RESPIRATION RATE: 18 BRPM | BODY MASS INDEX: 43.39 KG/M2 | SYSTOLIC BLOOD PRESSURE: 134 MMHG | OXYGEN SATURATION: 96 % | HEART RATE: 82 BPM | DIASTOLIC BLOOD PRESSURE: 78 MMHG | TEMPERATURE: 98.1 F | WEIGHT: 270 LBS

## 2023-01-31 DIAGNOSIS — J01.00 ACUTE NON-RECURRENT MAXILLARY SINUSITIS: Primary | ICD-10-CM

## 2023-01-31 RX ORDER — FLUTICASONE PROPIONATE 50 MCG
1 SPRAY, SUSPENSION (ML) NASAL DAILY
Qty: 9.9 ML | Refills: 0 | Status: SHIPPED | OUTPATIENT
Start: 2023-01-31

## 2023-01-31 RX ORDER — AMOXICILLIN 500 MG/1
500 CAPSULE ORAL EVERY 8 HOURS SCHEDULED
Qty: 15 CAPSULE | Refills: 0 | Status: SHIPPED | OUTPATIENT
Start: 2023-01-31 | End: 2023-02-05

## 2023-01-31 NOTE — PROGRESS NOTES
Name: Fercho Kraft      : 1970      MRN: [id number]  Encounter Provider: Sarah Saavedra MD  Encounter Date: 2023   Encounter department: [address] [place] [address]     1  Acute non-recurrent maxillary sinusitis  -     amoxicillin (AMOXIL) 500 mg capsule; Take 1 capsule (500 mg total) by mouth every 8 (eight) hours for 5 days  -     fluticasone (FLONASE) 50 mcg/act nasal spray; 1 spray into each nostril daily      Depression Screening and Follow-up Plan: Patient was screened for depression during today's encounter  They screened negative with a PHQ-2 score of 0  Subjective      Patient presents with:  Sinus Problem: Sinus congestion  Headache: Pressure in head area, since Friday,  clear mucus, runny nose , sneezing  Tylenol Cold and sinus  Clear mucus runny nose and sneezing  Getting worse  Sore and full on the left side  Review of Systems   Constitutional: Positive for fatigue  HENT: Positive for congestion, rhinorrhea, sinus pressure and sore throat  Neurological: Positive for headaches  Current Outpatient Medications on File Prior to Visit   Medication Sig   • ascorbic acid (VITAMIN C) 500 mg tablet Take 500 mg by mouth daily   • carbidopa-levodopa (SINEMET)  mg per tablet Take 1 tablet by mouth Three times a day   • glucosamine-chondroitin 500-400 MG tablet Take 1 tablet by mouth 3 (three) times a day   • Multiple Vitamin (DAILY VALUE MULTIVITAMIN) TABS Take 1 tablet by mouth daily   • Omega-3 Fatty Acids (FISH OIL) 1,000 mg Take 1,000 mg by mouth daily       Objective     /78 (BP Location: Left arm, Patient Position: Sitting, Cuff Size: Large)   Pulse 82   Temp 98 1 °F (36 7 °C) (Temporal)   Resp 18   Ht 5' 6" (1 676 m)   Wt 122 kg (270 lb)   SpO2 96%   BMI 43 58 kg/m²     Physical Exam  Vitals and nursing note reviewed  HENT:      Head: Normocephalic and atraumatic  Nose: Congestion present        Right Turbinates: Swollen  Left Turbinates: Swollen  Mouth/Throat:      Pharynx: Posterior oropharyngeal erythema present  No oropharyngeal exudate  Eyes:      Pupils: Pupils are equal, round, and reactive to light  Cardiovascular:      Rate and Rhythm: Normal rate and regular rhythm  Pulmonary:      Effort: Pulmonary effort is normal  No respiratory distress  Neurological:      Mental Status: She is alert         Francia Oppenheim, MD

## 2023-02-20 ENCOUNTER — HOSPITAL ENCOUNTER (OUTPATIENT)
Dept: RADIOLOGY | Age: 53
Discharge: HOME | End: 2023-02-20

## 2023-02-20 VITALS — BODY MASS INDEX: 43.39 KG/M2 | WEIGHT: 270 LBS | HEIGHT: 66 IN

## 2023-02-20 DIAGNOSIS — Z12.31 ENCOUNTER FOR SCREENING MAMMOGRAM FOR MALIGNANT NEOPLASM OF BREAST: ICD-10-CM

## 2023-06-28 ENCOUNTER — CLINICAL SUPPORT (OUTPATIENT)
Dept: FAMILY MEDICINE CLINIC | Facility: CLINIC | Age: 53
End: 2023-06-28
Payer: COMMERCIAL

## 2023-06-28 DIAGNOSIS — Z23 ENCOUNTER FOR IMMUNIZATION: Primary | ICD-10-CM

## 2023-06-28 PROCEDURE — 90471 IMMUNIZATION ADMIN: CPT

## 2023-06-28 PROCEDURE — 90750 HZV VACC RECOMBINANT IM: CPT

## 2023-12-26 ENCOUNTER — EVALUATION (OUTPATIENT)
Dept: PHYSICAL THERAPY | Facility: CLINIC | Age: 53
End: 2023-12-26
Payer: COMMERCIAL

## 2023-12-26 DIAGNOSIS — M54.2 NECK PAIN: Primary | ICD-10-CM

## 2023-12-26 PROCEDURE — 97140 MANUAL THERAPY 1/> REGIONS: CPT | Performed by: PHYSICAL THERAPIST

## 2023-12-26 PROCEDURE — 97161 PT EVAL LOW COMPLEX 20 MIN: CPT | Performed by: PHYSICAL THERAPIST

## 2023-12-26 PROCEDURE — 97112 NEUROMUSCULAR REEDUCATION: CPT | Performed by: PHYSICAL THERAPIST

## 2023-12-26 NOTE — PROGRESS NOTES
PT Evaluation     Today's date: 2023  Patient name: Sandra Bradley  : 1970  MRN: [id number]  Referring provider: Dwaine Erickson  Dx:   Encounter Diagnosis     ICD-10-CM    1. Neck pain  M54.2                      Assessment  Assessment details: Sandra Bradley is a 53 y.o. female who presents with neck pain with mobility deficits. Pt presents with decreased cervical rotation, cervical hypomobility of the upper Cspine, and thoracic hypomobility. Due to these impairments, the patient has difficulty performing activities such as looking over her shoulder when driving. Patient would benefit from skilled physical therapy to address the impairments, improve their level of function, and to improve their overall quality of life.    Impairments: abnormal or restricted ROM, abnormal movement, activity intolerance, lacks appropriate home exercise program, pain with function and poor posture   Prognosis details: Positive prognostic indicators include: absence of observed red flags, positive attitude towards recovery, good understanding of diagnosis and treatment plan   Negative prognostic indicators include: none      Goals  Short Term Goals: to be achieved by 4 weeks  1) Patient to be independent with basic HEP.  2) Decrease pain to 2/10 at its worst.  3) Increase cervical spine ROM by 5-10 degrees in all deficient planes.  4) Increase UE strength by 1/2 MMT grade in all deficient planes.  5) Improve joint mobility in cervical spine to normal     Long Term Goals: to be achieved by discharge  1) FOTO equal to or greater than expected.  2) Patient to be independent with comprehensive HEP.  3) Cervical spine ROM WNL all planes to improve a/iadls.  4) Increase UE strength to 1 MMT grade in all planes to improve a/iadls.  5) Lifting is improved to maximal level of function       Plan  Patient would benefit from: PT eval and skilled physical therapy  Planned modality interventions: low level laser therapy  Planned therapy  interventions: manual therapy, neuromuscular re-education, patient education, self care, therapeutic activities, therapeutic exercise, activity modification and home exercise program  Frequency: 2x/week for 4-6 weeks.  Treatment plan discussed with: patient        Subjective Evaluation    History of Present Illness  Mechanism of injury: History: Pt reports neck pain that has been going on since around Halloween. Some days are better than others. Symptoms are usually worse in the morning and gets better as the day goes on. Denies any tingling.     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[employer]   Imaging: none  Red flags: Sandra denies a new onset of Dizziness, Dysphagia, Dysarthria, Drop attacks, Diplopia, Nausea, Ataxia, and Constant night pain    Patient Goals  Patient goals for therapy: decreased pain    Pain  Current pain rating: 3  At best pain rating: 3  At worst pain ratin  Location: right side, levator  Quality: dull ache        Objective     Neurological Testing     Sensation   Cervical/Thoracic   Left   Intact: light touch    Right   Intact: light touch    Active Range of Motion   Cervical/Thoracic Spine       Cervical    Flexion: 55 degrees   Extension: 45 degrees      Left lateral flexion: 35 degrees      Right lateral flexion: 30 degrees     with pain  Left rotation: 75 degrees  Right rotation: 65 degrees    with pain    Joint Play     Hypomobile: C2, C3, C4, T1, T2, T3, T4 and T5     Pain: C2     Strength/Myotome Testing     Left Shoulder     Planes of Motion   Flexion: 4+   Abduction: 4+   External rotation at 0°: 5   Internal rotation at 0°: 5     Right Shoulder     Planes of Motion   Flexion: 4   Abduction: 4   External rotation at 0°: 5   Internal rotation at 0°: 5              Precautions: PD    Re-Eval:    Authorization:    1:1 with PT:    Visit Count 1 2 3 4 5   Date:          Manuals        Cervical sideglides GII ADOLFO       Levator, UT, scalene STM                 Neuro Re-Ed        TB rows        TB pull downs        TB no monies         Scap squeeze HEP       Education Sleeping positioning and posture                        There Ex         Active warm up  UBE      Thoracic open book HEP       Thoracic extension over bolster HEP                                                      Ther Act                                                                                 Modalities

## 2023-12-26 NOTE — LETTER
2023    Dwaine Erickson  [address]  [address]  [place] [address]    Patient: Sandra Bradley   YOB: 1970   Date of Visit: 2023     Encounter Diagnosis     ICD-10-CM    1. Neck pain  M54.2           Dear Dr. Erickson:    Thank you for your recent referral of Sandra Bradley. Please review the attached evaluation summary from Sandra's recent visit.     Please verify that you agree with the plan of care by signing the attached order.     If you have any questions or concerns, please do not hesitate to call.     I sincerely appreciate the opportunity to share in the care of one of your patients and hope to have another opportunity to work with you in the near future.       Sincerely,    Jose Chacko, PT      Referring Provider:      I certify that I have read the below Plan of Care and certify the need for these services furnished under this plan of treatment while under my care.                    Dwaine Erickson  [address]  [address]  [facility] [address]  Via Fax: [phone]          PT Evaluation     Today's date: 2023  Patient name: Sandra Bradley  : 1970  MRN: [id number]  Referring provider: Dwaine Erickson  Dx:   Encounter Diagnosis     ICD-10-CM    1. Neck pain  M54.2                      Assessment  Assessment details: Sandra Bradley is a 53 y.o. female who presents with neck pain with mobility deficits. Pt presents with decreased cervical rotation, cervical hypomobility of the upper Cspine, and thoracic hypomobility. Due to these impairments, the patient has difficulty performing activities such as looking over her shoulder when driving. Patient would benefit from skilled physical therapy to address the impairments, improve their level of function, and to improve their overall quality of life.    Impairments: abnormal or restricted ROM, abnormal movement, activity intolerance, lacks appropriate home exercise program, pain with function and  poor posture   Prognosis details: Positive prognostic indicators include: absence of observed red flags, positive attitude towards recovery, good understanding of diagnosis and treatment plan   Negative prognostic indicators include: none      Goals  Short Term Goals: to be achieved by 4 weeks  1) Patient to be independent with basic HEP.  2) Decrease pain to 2/10 at its worst.  3) Increase cervical spine ROM by 5-10 degrees in all deficient planes.  4) Increase UE strength by 1/2 MMT grade in all deficient planes.  5) Improve joint mobility in cervical spine to normal     Long Term Goals: to be achieved by discharge  1) FOTO equal to or greater than expected.  2) Patient to be independent with comprehensive HEP.  3) Cervical spine ROM WNL all planes to improve a/iadls.  4) Increase UE strength to 1 MMT grade in all planes to improve a/iadls.  5) Lifting is improved to maximal level of function       Plan  Patient would benefit from: PT eval and skilled physical therapy  Planned modality interventions: low level laser therapy  Planned therapy interventions: manual therapy, neuromuscular re-education, patient education, self care, therapeutic activities, therapeutic exercise, activity modification and home exercise program  Frequency: 2x/week for 4-6 weeks.  Treatment plan discussed with: patient        Subjective Evaluation    History of Present Illness  Mechanism of injury: History: Pt reports neck pain that has been going on since around Halloween. Some days are better than others. Symptoms are usually worse in the morning and gets better as the day goes on. Denies any tingling.     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[employer]   Imaging: none  Red flags: Sandra denies a new onset of Dizziness, Dysphagia, Dysarthria, Drop attacks, Diplopia, Nausea, Ataxia, and Constant night pain    Patient  Goals  Patient goals for therapy: decreased pain    Pain  Current pain rating: 3  At best pain rating: 3  At worst pain ratin  Location: right side, levator  Quality: dull ache        Objective     Neurological Testing     Sensation   Cervical/Thoracic   Left   Intact: light touch    Right   Intact: light touch    Active Range of Motion   Cervical/Thoracic Spine       Cervical    Flexion: 55 degrees   Extension: 45 degrees      Left lateral flexion: 35 degrees      Right lateral flexion: 30 degrees     with pain  Left rotation: 75 degrees  Right rotation: 65 degrees    with pain    Joint Play     Hypomobile: C2, C3, C4, T1, T2, T3, T4 and T5     Pain: C2     Strength/Myotome Testing     Left Shoulder     Planes of Motion   Flexion: 4+   Abduction: 4+   External rotation at 0°: 5   Internal rotation at 0°: 5     Right Shoulder     Planes of Motion   Flexion: 4   Abduction: 4   External rotation at 0°: 5   Internal rotation at 0°: 5              Precautions: PD    Re-Eval:    Authorization:    1:1 with PT:    Visit Count 1 2 3 4 5   Date:         Manuals        Cervical sideglides GII ADOLFO       Levator, UT, scalene STM                 Neuro Re-Ed        TB rows        TB pull downs        TB no monies         Scap squeeze HEP       Education Sleeping positioning and posture                        There Ex         Active warm up  UBE      Thoracic open book HEP       Thoracic extension over bolster HEP                                                      Ther Act                                                                                 Modalities

## 2023-12-28 ENCOUNTER — OFFICE VISIT (OUTPATIENT)
Dept: PHYSICAL THERAPY | Facility: CLINIC | Age: 53
End: 2023-12-28
Payer: COMMERCIAL

## 2023-12-28 DIAGNOSIS — M54.2 NECK PAIN: Primary | ICD-10-CM

## 2023-12-28 PROCEDURE — 97110 THERAPEUTIC EXERCISES: CPT | Performed by: PHYSICAL THERAPIST

## 2023-12-28 PROCEDURE — 97112 NEUROMUSCULAR REEDUCATION: CPT | Performed by: PHYSICAL THERAPIST

## 2023-12-28 PROCEDURE — 97140 MANUAL THERAPY 1/> REGIONS: CPT | Performed by: PHYSICAL THERAPIST

## 2023-12-28 NOTE — PROGRESS NOTES
"Daily Note     Today's date: 2023  Patient name: Sandra Bradley  : 1970  MRN: [id number]  Referring provider: Dwaine Erickson  Dx:   Encounter Diagnosis     ICD-10-CM    1. Neck pain  M54.2                      Subjective: Pt reports feeling good after IE       Objective: See treatment diary below      Assessment: Patient did well with treatment today and had no pain at the end oft he session. Focused on stretching and improving mobility of the cervical spine as well as beginning TB exercises for periscaps trength and postural awareness. Continue to progress as tolerated.       Plan: Continue per plan of care.      Precautions: PD    Re-Eval:    Authorization:    1:1 with PT: 1031-11   Visit Count 1 2 3 4 5   Date:        Manuals        Cervical sideglides GII ADOLFO GII ADOLFO      Levator, UT, scalene STM   ADOLFO              Neuro Re-Ed        TB rows  RTB 2x10      TB pull downs  RTB 2x10      TB no monies   RTB 2x10      Scap squeeze HEP       Education Sleeping positioning and posture        Bent over row   3# 3x10              There Ex         Active warm up  UBE 2/2      Thoracic open book HEP       Thoracic extension over bolster HEP 10x10\"                                                     Ther Act                                                                                 Modalities                                            "

## 2024-01-02 ENCOUNTER — OFFICE VISIT (OUTPATIENT)
Dept: PHYSICAL THERAPY | Facility: CLINIC | Age: 54
End: 2024-01-02
Payer: COMMERCIAL

## 2024-01-02 DIAGNOSIS — M54.2 NECK PAIN: Primary | ICD-10-CM

## 2024-01-02 PROCEDURE — 97140 MANUAL THERAPY 1/> REGIONS: CPT | Performed by: PHYSICAL THERAPIST

## 2024-01-02 PROCEDURE — 97112 NEUROMUSCULAR REEDUCATION: CPT | Performed by: PHYSICAL THERAPIST

## 2024-01-02 PROCEDURE — 97110 THERAPEUTIC EXERCISES: CPT | Performed by: PHYSICAL THERAPIST

## 2024-01-02 NOTE — PROGRESS NOTES
"Daily Note     Today's date: 2024  Patient name: Sandra Bradley  : 1970  MRN: [id number]  Referring provider: Dwaine Erickson  Dx:   Encounter Diagnosis     ICD-10-CM    1. Neck pain  M54.2                      Subjective: Pt reports feeling 97% back to desired function and is ready to dc to [facility]      Objective:           Cervical     Flexion: 80 degrees   Extension: 55 degrees      Left lateral flexion: 35 degrees      Right lateral flexion: 35 degrees     Left rotation: 80 degrees  Right rotation: 75 degrees      Joint Play      Hypomobile: C2, C3, C4, T1, T2, T3, T4 and T5        Strength/Myotome Testing      Left Shoulder      Planes of Motion   Flexion: 4+   Abduction: 4+   External rotation at 0°: 5   Internal rotation at 0°: 5      Right Shoulder      Planes of Motion   Flexion: 4   Abduction: 4   External rotation at 0°: 5   Internal rotation at 0°: 5             Assessment: Patient reports perceived improvement of 97%. Functional outcome status measure improved from 59 to 82 at last taking. Pain levels have reduced to 0/10.  Patient is able to functionally complete all desired activities and is discharged to [facility].         Plan: Discharge     Precautions: PD    Re-Eval:    Authorization:    1:1 with PT: 1115-12   Visit Count 1 2 3 4 5   Date:       Manuals        Cervical sideglides GII ADOLFO GII ADOLFO GII ADOLFO      Levator, UT, scalene STM   ADOLFO ADOLFO              Neuro Re-Ed        TB rows  RTB 2x10 GTB 3x10     TB pull downs  RTB 2x10 GTB 3x10     TB no monies   RTB 2x10 GTB 3x10     Scap squeeze HEP       Education Sleeping positioning and posture        Bent over row   3# 3x10 3# 3x10     Straight arm extension   3# 3x10     There Ex         Active warm up  UBE 2/2 UBE3/3     Thoracic open book HEP       Thoracic extension over bolster HEP 10x10\"                                                     Ther Act                                                                               "   Modalities

## 2024-02-21 PROBLEM — Z01.419 ENCOUNTER FOR GYNECOLOGICAL EXAMINATION (GENERAL) (ROUTINE) WITHOUT ABNORMAL FINDINGS: Status: RESOLVED | Noted: 2021-11-23 | Resolved: 2024-02-21

## 2024-04-11 ENCOUNTER — TELEPHONE (OUTPATIENT)
Age: 54
End: 2024-04-11

## 2024-04-11 DIAGNOSIS — Z12.31 ENCOUNTER FOR SCREENING MAMMOGRAM FOR MALIGNANT NEOPLASM OF BREAST: Primary | ICD-10-CM

## 2024-04-11 NOTE — TELEPHONE ENCOUNTER
Patient called for new Cologaurd order so that she can reapeat the test as advised back in 2020.   Released  Seen Back to Top    Your cologuard test was negative, we will plan to repeat in 3 years   Written by Shyla Pang MD on 11/2/2020  6:15 PM EST  Seen by patient Sandra Bradley on 4/11/2024  7:23 AM    Please call patient and let her know that this was ordered so that she can be on look out for delivery of specimen box

## 2024-04-15 ENCOUNTER — OFFICE VISIT (OUTPATIENT)
Dept: FAMILY MEDICINE CLINIC | Facility: CLINIC | Age: 54
End: 2024-04-15
Payer: COMMERCIAL

## 2024-04-15 VITALS
TEMPERATURE: 97.6 F | WEIGHT: 276 LBS | RESPIRATION RATE: 16 BRPM | HEIGHT: 66 IN | DIASTOLIC BLOOD PRESSURE: 86 MMHG | HEART RATE: 78 BPM | BODY MASS INDEX: 44.36 KG/M2 | OXYGEN SATURATION: 98 % | SYSTOLIC BLOOD PRESSURE: 138 MMHG

## 2024-04-15 DIAGNOSIS — Z00.00 ANNUAL PHYSICAL EXAM: Primary | ICD-10-CM

## 2024-04-15 DIAGNOSIS — I10 PRIMARY HYPERTENSION: ICD-10-CM

## 2024-04-15 DIAGNOSIS — E66.01 MORBID OBESITY DUE TO EXCESS CALORIES (HCC): ICD-10-CM

## 2024-04-15 DIAGNOSIS — Z12.11 SCREENING FOR COLORECTAL CANCER: ICD-10-CM

## 2024-04-15 DIAGNOSIS — G20.A1 PARKINSON'S DISEASE, UNSPECIFIED WHETHER DYSKINESIA PRESENT, UNSPECIFIED WHETHER MANIFESTATIONS FLUCTUATE: ICD-10-CM

## 2024-04-15 DIAGNOSIS — Z12.12 SCREENING FOR COLORECTAL CANCER: ICD-10-CM

## 2024-04-15 PROBLEM — R27.8 WORSENED HANDWRITING: Status: RESOLVED | Noted: 2020-10-19 | Resolved: 2024-04-15

## 2024-04-15 PROCEDURE — 99396 PREV VISIT EST AGE 40-64: CPT | Performed by: FAMILY MEDICINE

## 2024-04-15 PROCEDURE — 99214 OFFICE O/P EST MOD 30 MIN: CPT | Performed by: FAMILY MEDICINE

## 2024-04-15 RX ORDER — AMLODIPINE BESYLATE 5 MG/1
5 TABLET ORAL DAILY
Qty: 90 TABLET | Refills: 3 | Status: SHIPPED | OUTPATIENT
Start: 2024-04-15

## 2024-04-15 NOTE — PROGRESS NOTES
ADULT ANNUAL PHYSICAL  [facility]    NAME: Sandra Bradley  AGE: 53 y.o. SEX: female  : 1970     DATE: 4/15/2024     Assessment and Plan:     Problem List Items Addressed This Visit        Cardiovascular and Mediastinum    Primary hypertension    Relevant Medications    amLODIPine (NORVASC) 5 mg tablet    Other Relevant Orders    CBC and Platelet    Comprehensive metabolic panel       Nervous and Auditory    Parkinson's disease       Other    Morbid obesity due to excess calories (HCC)    Relevant Orders    Lipid panel   Other Visit Diagnoses     Annual physical exam    -  Primary    Screening for colorectal cancer        Relevant Orders    Cologuard        Annual physical and Problem visit     Blood pressure elevated.  Plan to start patient on amlodipine 5 mg daily.  Recommend patient obtain an [employer] blood pressure cuff.  Keep a log at home.  Send us a message with the average readings.   Obtain blood work listed above  Cologuard ordered  Mammogram scheduled.    Immunizations and preventive care screenings were discussed with patient today. Appropriate education was printed on patient's after visit summary.    Counseling:  Alcohol/drug use: discussed moderation in alcohol intake, the recommendations for healthy alcohol use, and avoidance of illicit drug use.  Dental Health: discussed importance of regular tooth brushing, flossing, and dental visits.  Injury prevention: discussed safety/seat belts, safety helmets, smoke detectors, carbon dioxide detectors, and smoking near bedding or upholstery.  Sexual health: discussed sexually transmitted diseases, partner selection, use of condoms, avoidance of unintended pregnancy, and contraceptive alternatives.  Exercise: the importance of regular exercise/physical activity was discussed. Recommend exercise 3-5 times per week for at least 30 minutes.     BMI Counseling: Body mass index is 44.55 kg/m². The BMI is above  normal. Nutrition recommendations include decreasing portion sizes, reducing intake of saturated and trans fat and reducing intake of cholesterol. Exercise recommendations include moderate physical activity 150 minutes/week. No pharmacotherapy was ordered. Patient referred to PCP. Rationale for BMI follow-up plan is due to patient being overweight or obese.     Depression Screening and Follow-up Plan: Patient was screened for depression during today's encounter. They screened negative with a PHQ-2 score of 0.        No follow-ups on file.     Chief Complaint:     Chief Complaint   Patient presents with   • Physical Exam     Patient would like cologarud      History of Present Illness:     Adult Annual Physical   Patient here for a comprehensive physical exam. The patient reports no problems.    Diet and Physical Activity  Diet/Nutrition: consuming 3-5 servings of fruits/vegetables daily and Med diet .   Exercise: 1-2 times a week on average and boxing .      Depression Screening  PHQ-2/9 Depression Screening    Little interest or pleasure in doing things: 0 - not at all  Feeling down, depressed, or hopeless: 0 - not at all  PHQ-2 Score: 0  PHQ-2 Interpretation: Negative depression screen       General Health  Sleep: gets 7-8 hours of sleep on average.   Hearing: normal - bilateral.  Vision:  readers .   Dental: regular dental visits.       /GYN Health  Patient is: premenopausal  Last menstrual period:May 2023     Review of Systems:     Review of Systems   Constitutional:  Negative for fatigue and fever.   HENT:  Negative for sore throat.    Eyes:  Negative for visual disturbance.   Respiratory:  Negative for cough, chest tightness and shortness of breath.    Cardiovascular:  Negative for chest pain, palpitations and leg swelling.   Gastrointestinal:  Negative for abdominal pain, constipation, diarrhea and nausea.   Endocrine: Negative for cold intolerance and heat intolerance.   Genitourinary:  Negative for flank  pain.   Musculoskeletal:  Negative for back pain and neck pain.   Skin:  Negative for rash.   Neurological:  Positive for weakness (right hand). Negative for headaches.   Psychiatric/Behavioral:  Negative for behavioral problems and confusion.       Past Medical History:     Past Medical History:   Diagnosis Date   • Acne    • Arthritis    • Obesity    • Parkinson disease    • Varicella       Past Surgical History:     Past Surgical History:   Procedure Laterality Date   •  SECTION   &    • TUBAL LIGATION      After       Social History:     Social History     Socioeconomic History   • Marital status: /Civil Union     Spouse name: None   • Number of children: None   • Years of education: None   • Highest education level: None   Occupational History   • None   Tobacco Use   • Smoking status: Never   • Smokeless tobacco: Never   Vaping Use   • Vaping status: Never Used   Substance and Sexual Activity   • Alcohol use: Yes     Alcohol/week: 1.0 standard drink of alcohol     Types: 1 Glasses of wine per week     Comment: social   • Drug use: No   • Sexual activity: Yes     Partners: Male     Birth control/protection: Female Sterilization     Comment: Tubal ligation    Other Topics Concern   • None   Social History Narrative   • None     Social Determinants of Health     Financial Resource Strain: Not on file   Food Insecurity: Not on file   Transportation Needs: Not on file   Physical Activity: Not on file   Stress: Not on file   Social Connections: Not on file   Intimate Partner Violence: Not on file   Housing Stability: Not on file      Family History:     Family History   Problem Relation Age of Onset   • Cancer Mother         Gall bladder   • Heart disease Father    • Breast cancer Neg Hx    • Ovarian cancer Neg Hx    • Colon cancer Neg Hx       Current Medications:     Current Outpatient Medications   Medication Sig Dispense Refill   • amLODIPine (NORVASC) 5 mg tablet Take 1  "tablet (5 mg total) by mouth daily 90 tablet 3   • ascorbic acid (VITAMIN C) 500 mg tablet Take 500 mg by mouth daily     • carbidopa-levodopa (SINEMET)  mg per tablet Take 1 tablet by mouth Three times a day     • glucosamine-chondroitin 500-400 MG tablet Take 1 tablet by mouth 3 (three) times a day     • Multiple Vitamin (DAILY VALUE MULTIVITAMIN) TABS Take 1 tablet by mouth daily     • Omega-3 Fatty Acids (FISH OIL) 1,000 mg Take 1,000 mg by mouth daily       No current facility-administered medications for this visit.      Allergies:     No Known Allergies   Physical Exam:     /86 (BP Location: Left arm, Patient Position: Sitting, Cuff Size: Large)   Pulse 78   Temp 97.6 °F (36.4 °C)   Resp 16   Ht 5' 6\" (1.676 m)   Wt 125 kg (276 lb)   SpO2 98%   BMI 44.55 kg/m²     Physical Exam  Vitals and nursing note reviewed.   Constitutional:       Appearance: She is well-developed. She is obese.   HENT:      Head: Normocephalic and atraumatic.      Nose: Nose normal.   Eyes:      Extraocular Movements: Extraocular movements intact.      Conjunctiva/sclera: Conjunctivae normal.      Pupils: Pupils are equal, round, and reactive to light.   Cardiovascular:      Rate and Rhythm: Normal rate and regular rhythm.      Heart sounds: Normal heart sounds.   Pulmonary:      Effort: Pulmonary effort is normal.      Breath sounds: Normal breath sounds.   Abdominal:      General: Bowel sounds are normal.      Palpations: Abdomen is soft.   Musculoskeletal:         General: Normal range of motion.      Cervical back: Normal range of motion.      Comments: Right  strength 4/5   Skin:     General: Skin is warm and dry.   Neurological:      General: No focal deficit present.      Mental Status: She is alert and oriented to person, place, and time.   Psychiatric:         Mood and Affect: Mood normal.         Speech: Speech normal.         Behavior: Behavior normal.          MD LORENA Quinonez " [facility]

## 2024-05-08 LAB — COLOGUARD RESULT REPORTABLE: NEGATIVE

## 2024-06-10 LAB
ALBUMIN SERPL-MCNC: 4.2 G/DL (ref 3.6–5.1)
ALBUMIN/GLOB SERPL: 1.4 (CALC) (ref 1–2.5)
ALP SERPL-CCNC: 94 U/L (ref 37–153)
ALT SERPL-CCNC: 26 U/L (ref 6–29)
AST SERPL-CCNC: 21 U/L (ref 10–35)
BILIRUB SERPL-MCNC: 0.6 MG/DL (ref 0.2–1.2)
BUN SERPL-MCNC: 13 MG/DL (ref 7–25)
BUN/CREAT SERPL: NORMAL (CALC) (ref 6–22)
CALCIUM SERPL-MCNC: 9.5 MG/DL (ref 8.6–10.4)
CHLORIDE SERPL-SCNC: 103 MMOL/L (ref 98–110)
CHOLEST SERPL-MCNC: 206 MG/DL
CHOLEST/HDLC SERPL: 3.3 (CALC)
CO2 SERPL-SCNC: 29 MMOL/L (ref 20–32)
CREAT SERPL-MCNC: 0.65 MG/DL (ref 0.5–1.03)
ERYTHROCYTE [DISTWIDTH] IN BLOOD BY AUTOMATED COUNT: 12.1 % (ref 11–15)
GFR/BSA.PRED SERPLBLD CYS-BASED-ARV: 104 ML/MIN/1.73M2
GLOBULIN SER CALC-MCNC: 3.1 G/DL (CALC) (ref 1.9–3.7)
GLUCOSE SERPL-MCNC: 96 MG/DL (ref 65–99)
HCT VFR BLD AUTO: 43.4 % (ref 35–45)
HDLC SERPL-MCNC: 62 MG/DL
HGB BLD-MCNC: 14.4 G/DL (ref 11.7–15.5)
LDLC SERPL CALC-MCNC: 111 MG/DL (CALC)
MCH RBC QN AUTO: 29.6 PG (ref 27–33)
MCHC RBC AUTO-ENTMCNC: 33.2 G/DL (ref 32–36)
MCV RBC AUTO: 89.3 FL (ref 80–100)
NONHDLC SERPL-MCNC: 144 MG/DL (CALC)
PLATELET # BLD AUTO: 328 THOUSAND/UL (ref 140–400)
PMV BLD REES-ECKER: 11.8 FL (ref 7.5–12.5)
POTASSIUM SERPL-SCNC: 4.7 MMOL/L (ref 3.5–5.3)
PROT SERPL-MCNC: 7.3 G/DL (ref 6.1–8.1)
RBC # BLD AUTO: 4.86 MILLION/UL (ref 3.8–5.1)
SODIUM SERPL-SCNC: 141 MMOL/L (ref 135–146)
TRIGL SERPL-MCNC: 221 MG/DL
WBC # BLD AUTO: 6.9 THOUSAND/UL (ref 3.8–10.8)

## 2024-06-25 NOTE — PROGRESS NOTES
Assessment/Plan:  Calcium 2395-8031 mg (in divided doses-max 600 mg at one time) + 600-1000 IU Vit D daily.   Exercise 150-300 minutes per week minimum including weight bearing exercises. Weight loss recommended.   Pap with high risk HPV Q 5 years, if normal.  Due   Call your insurance company to verify coverage prior to completing any ordered tests.   Annual mammogram already ordered and monthly breast self exam recommended.    Colonoscopy-  Due         Kegels 20 times twice daily.   Silicone based lubricant with sex. (Use water based lubricant with condoms or sexual toys.)    Vaginal moisturizers twice weekly as needed.   Return to office in one year or sooner, if needed.        1. Encntr for gyn exam (general) (routine) w/o abn findings  2. Encounter for screening mammogram for breast cancer  3. BMI 45.0-49.9, adult (HCC)             Subjective:      Patient ID: Sandra Bradley is a 53 y.o. female.    HPI    Sandra Bradley is a 53 y.o.  ( 26 yo boy, 24 yo boy, they have a girl dog)  female who is here today for her annual visit. Diagnosis of parkinsons in . Medication has improved symptoms.    /92. Asymptomatic and monitors at home.   Just returned from a 13 day trip to [place].   Menopausal with no vaginal bleeding. LMP 23.  Exercise- walks dog nightly, does parkinsons boxing twice weekly at [facility].   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     Sandra Bradley is sexually active with male partner/  of 34 years. Monogamous and feels safe in this relationship. Denies vaginal pain,bleeding but does have some dryness.  Uses  lubrication.   She does not use contraception.    She is not interested in STD screening today.   She denies vaginal discharge, itching or pelvic pain.   She has no urinary concerns, does not have incontinence.  No bowel concerns.  No breast concerns.     Last pap:   16 normal pap with negative HR HPV   2021 normal pap with  "negative HR HPV   Mammogram: 02/20/2023 normal   Colonoscopy: 10/27/20 negative cologard.   4/30/24 negative cologard  DEXA scan: Not on file      Family history of cancer:   Cancer-related family history includes Cancer in her mother. There is no history of Breast cancer, Ovarian cancer, or Colon cancer.        The following portions of the patient's history were reviewed and updated as appropriate: allergies, current medications, past family history, past medical history, past social history, past surgical history, and problem list.    Review of Systems   Constitutional: Negative.  Negative for activity change, appetite change, chills, diaphoresis, fatigue, fever and unexpected weight change.   HENT:  Negative for congestion, dental problem, sneezing, sore throat and trouble swallowing.    Eyes:  Negative for visual disturbance.   Respiratory:  Negative for chest tightness and shortness of breath.    Cardiovascular:  Negative for chest pain and leg swelling.   Gastrointestinal:  Negative for abdominal pain, constipation, diarrhea, nausea and vomiting.   Genitourinary:  Negative for difficulty urinating, dyspareunia, dysuria, frequency, hematuria, pelvic pain, urgency, vaginal bleeding, vaginal discharge and vaginal pain.   Musculoskeletal:  Negative for back pain and neck pain.   Skin: Negative.    Allergic/Immunologic: Negative.    Neurological:  Negative for weakness and headaches.   Hematological:  Negative for adenopathy.   Psychiatric/Behavioral: Negative.           Objective:      /92 (BP Location: Left arm, Patient Position: Sitting, Cuff Size: Large)   Ht 5' 4.5\" (1.638 m)   Wt 121 kg (267 lb)   LMP 05/09/2023 (Exact Date)   BMI 45.12 kg/m²          Physical Exam  Vitals and nursing note reviewed.   Constitutional:       Appearance: Normal appearance. She is well-developed.      Comments: BMI 45   HENT:      Head: Normocephalic.   Neck:      Thyroid: No thyromegaly.   Cardiovascular:      Rate " and Rhythm: Normal rate and regular rhythm.      Heart sounds: Normal heart sounds.   Pulmonary:      Effort: Pulmonary effort is normal.      Breath sounds: Normal breath sounds.   Chest:   Breasts:     Breasts are symmetrical.      Right: Normal. No inverted nipple, mass, nipple discharge, skin change or tenderness.      Left: Normal. No inverted nipple, mass, nipple discharge, skin change or tenderness.   Abdominal:      Palpations: Abdomen is soft.   Genitourinary:     General: Normal vulva.      Exam position: Lithotomy position.      Labia:         Right: No rash, tenderness, lesion or injury.         Left: No rash, tenderness, lesion or injury.       Urethra: No prolapse, urethral pain, urethral swelling or urethral lesion.      Vagina: No signs of injury and foreign body. No vaginal discharge, erythema, tenderness, bleeding, lesions or prolapsed vaginal walls.      Cervix: Normal.      Uterus: Normal.       Adnexa: Right adnexa normal and left adnexa normal.        Right: No mass, tenderness or fullness.          Left: No mass, tenderness or fullness.        Rectum: No external hemorrhoid.      Comments: Vulvovaginal atrophy  Musculoskeletal:         General: Normal range of motion.      Cervical back: Normal range of motion.   Lymphadenopathy:      Head:      Right side of head: No submental, submandibular, tonsillar or occipital adenopathy.      Left side of head: No submental, submandibular, tonsillar or occipital adenopathy.      Upper Body:      Right upper body: No supraclavicular or axillary adenopathy.      Left upper body: No supraclavicular or axillary adenopathy.      Lower Body: No right inguinal adenopathy. No left inguinal adenopathy.   Skin:     General: Skin is warm and dry.   Neurological:      Mental Status: She is alert and oriented to person, place, and time.   Psychiatric:         Mood and Affect: Mood normal.         Behavior: Behavior normal. Behavior is cooperative.

## 2024-06-26 ENCOUNTER — ANNUAL EXAM (OUTPATIENT)
Dept: OBGYN CLINIC | Facility: MEDICAL CENTER | Age: 54
End: 2024-06-26
Payer: COMMERCIAL

## 2024-06-26 VITALS
DIASTOLIC BLOOD PRESSURE: 92 MMHG | WEIGHT: 267 LBS | BODY MASS INDEX: 44.48 KG/M2 | SYSTOLIC BLOOD PRESSURE: 130 MMHG | HEIGHT: 65 IN

## 2024-06-26 DIAGNOSIS — Z12.31 ENCOUNTER FOR SCREENING MAMMOGRAM FOR BREAST CANCER: ICD-10-CM

## 2024-06-26 DIAGNOSIS — Z01.419 ENCNTR FOR GYN EXAM (GENERAL) (ROUTINE) W/O ABN FINDINGS: Primary | ICD-10-CM

## 2024-06-26 PROCEDURE — 99396 PREV VISIT EST AGE 40-64: CPT | Performed by: NURSE PRACTITIONER

## 2024-06-26 NOTE — PATIENT INSTRUCTIONS
Calcium 6398-8863 mg (in divided doses-max 600 mg at one time) + 600-1000 IU Vit D daily.   Exercise 150-300 minutes per week minimum including weight bearing exercises.   Pap with high risk HPV Q 5 years, if normal.  Due 2026  Call your insurance company to verify coverage prior to completing any ordered tests.   Annual mammogram already ordered and monthly breast self exam recommended.    Colonoscopy-  Due 2027        Kegels 20 times twice daily.   Silicone based lubricant with sex. (Use water based lubricant with condoms or sexual toys.)    Vaginal moisturizers twice weekly as needed.   Return to office in one year or sooner, if needed.

## 2024-07-30 ENCOUNTER — HOSPITAL ENCOUNTER (OUTPATIENT)
Dept: RADIOLOGY | Age: 54
Discharge: HOME | End: 2024-07-30
Payer: COMMERCIAL

## 2024-07-30 VITALS — BODY MASS INDEX: 44.48 KG/M2 | WEIGHT: 267 LBS | HEIGHT: 65 IN

## 2024-07-30 DIAGNOSIS — Z12.31 ENCOUNTER FOR SCREENING MAMMOGRAM FOR MALIGNANT NEOPLASM OF BREAST: ICD-10-CM

## 2024-07-30 PROCEDURE — 77063 BREAST TOMOSYNTHESIS BI: CPT

## 2024-07-30 PROCEDURE — 77067 SCR MAMMO BI INCL CAD: CPT

## 2024-08-02 NOTE — RESULT ENCOUNTER NOTE
Normal  screening mammogram.   Continue with monthly breast self exam, annual clinical breast exam and annual mammogram.

## 2025-01-16 DIAGNOSIS — I10 PRIMARY HYPERTENSION: ICD-10-CM

## 2025-01-16 RX ORDER — AMLODIPINE BESYLATE 5 MG/1
5 TABLET ORAL DAILY
Qty: 90 TABLET | Refills: 0 | Status: SHIPPED | OUTPATIENT
Start: 2025-01-16

## 2025-03-01 ENCOUNTER — OFFICE VISIT (OUTPATIENT)
Dept: URGENT CARE | Facility: MEDICAL CENTER | Age: 55
End: 2025-03-01
Payer: COMMERCIAL

## 2025-03-01 VITALS
RESPIRATION RATE: 18 BRPM | DIASTOLIC BLOOD PRESSURE: 84 MMHG | SYSTOLIC BLOOD PRESSURE: 134 MMHG | TEMPERATURE: 98.9 F | OXYGEN SATURATION: 96 % | HEART RATE: 77 BPM

## 2025-03-01 DIAGNOSIS — J06.9 ACUTE URI: Primary | ICD-10-CM

## 2025-03-01 PROCEDURE — G0382 LEV 3 HOSP TYPE B ED VISIT: HCPCS | Performed by: NURSE PRACTITIONER

## 2025-03-01 NOTE — PROGRESS NOTES
[facility]  Name: Sandra Bradley      : 1970      MRN: [id number]  Encounter Provider: ERIC Barboza  Encounter Date: 3/1/2025   Encounter department: [facility]  :  Assessment & Plan  Acute URI           Patient Instructions  Continue supportive measures   Add PRN throat lozenges   Patient declines viral or infectious swabs at this time  Start Emergen C daily until symptoms resolve  PRN motrin/tylenol q6-8 hrs for pain/fever  F/u with PCP as needed  Proceed to ER should symptoms worsen    If tests are performed, our office will contact you with results only if changes need to made to the care plan discussed with you at the visit. You can review your full results on [facility] MyChart.    Chief Complaint:   Chief Complaint   Patient presents with    Cold Like Symptoms     Patient states for over a week she has had productive yellow cough ; using otc cough/flu medicine with little relief      History of Present Illness   55 y/o female presents for cough, congestion, sore throat, headaches since last Saturday. Patient has been using OTC medications which help ease her symptoms. Denies fever or chills.    Cough  This is a new problem. The current episode started in the past 7 days. The problem has been waxing and waning. The problem occurs every few hours. The cough is Non-productive. Associated symptoms include headaches, myalgias, nasal congestion, postnasal drip and a sore throat. Nothing aggravates the symptoms.     History obtained from: patient    Review of Systems   Constitutional: Negative.    HENT:  Positive for postnasal drip and sore throat.    Eyes: Negative.    Respiratory:  Positive for cough.    Cardiovascular: Negative.    Gastrointestinal: Negative.    Endocrine: Negative.    Genitourinary: Negative.    Musculoskeletal:  Positive for myalgias.   Skin: Negative.    Neurological:  Positive for headaches.   Hematological: Negative.    Psychiatric/Behavioral:  Negative.       Past Medical History   Past Medical History:   Diagnosis Date    Acne     Arthritis     Obesity     Parkinson disease (HCC)     Varicella      Past Surgical History:   Procedure Laterality Date     SECTION   &     TUBAL LIGATION      After      Family History   Problem Relation Age of Onset    Cancer Mother         Gall bladder    Heart disease Father     No Known Problems Brother     Breast cancer Neg Hx     Ovarian cancer Neg Hx     Colon cancer Neg Hx      she reports that she has never smoked. She has never used smokeless tobacco. She reports current alcohol use of about 1.0 standard drink of alcohol per week. She reports that she does not use drugs.  Current Outpatient Medications   Medication Instructions    amLODIPine (NORVASC) 5 mg, Oral, Daily    ascorbic acid (VITAMIN C) 500 mg, Oral, Daily    carbidopa-levodopa (SINEMET)  mg per tablet 1 tablet, Oral, 3 times daily    fish oil 1,000 mg, Oral, Daily    glucosamine-chondroitin 500-400 MG tablet 1 tablet, Oral, 3 times daily    Multiple Vitamin (DAILY VALUE MULTIVITAMIN) TABS 1 tablet, Oral, Daily   No Known Allergies   Objective   /84   Pulse 77   Temp 98.9 °F (37.2 °C) (Temporal)   Resp 18   LMP 2023 (Exact Date)   SpO2 96%      Physical Exam  Constitutional:       Appearance: Normal appearance.   HENT:      Head: Normocephalic and atraumatic.      Right Ear: Tympanic membrane, ear canal and external ear normal.      Left Ear: Tympanic membrane, ear canal and external ear normal.      Nose: Nose normal.      Mouth/Throat:      Mouth: Mucous membranes are moist.      Pharynx: Oropharynx is clear. Posterior oropharyngeal erythema present.   Eyes:      Conjunctiva/sclera: Conjunctivae normal.   Cardiovascular:      Rate and Rhythm: Normal rate and regular rhythm.      Pulses: Normal pulses.      Heart sounds: Normal heart sounds.   Pulmonary:      Effort: Pulmonary effort is normal.       Breath sounds: Normal breath sounds.   Abdominal:      Palpations: Abdomen is soft.   Musculoskeletal:         General: Normal range of motion.      Cervical back: Normal range of motion.   Lymphadenopathy:      Cervical: No cervical adenopathy.   Skin:     General: Skin is warm and dry.      Capillary Refill: Capillary refill takes less than 2 seconds.   Neurological:      General: No focal deficit present.      Mental Status: She is alert and oriented to person, place, and time.   Psychiatric:         Behavior: Behavior normal.         Thought Content: Thought content normal.

## 2025-03-01 NOTE — PATIENT INSTRUCTIONS
Continue supportive measures   Add PRN throat lozenges   Patient declines viral or infectious swabs at this time  Start Emergen C daily until symptoms resolve  PRN motrin/tylenol q6-8 hrs for pain/fever  F/u with PCP as needed  Proceed to ER should symptoms worsen

## 2025-04-14 DIAGNOSIS — I10 PRIMARY HYPERTENSION: ICD-10-CM

## 2025-04-16 RX ORDER — AMLODIPINE BESYLATE 5 MG/1
5 TABLET ORAL DAILY
Qty: 90 TABLET | Refills: 0 | OUTPATIENT
Start: 2025-04-16

## 2025-04-16 RX ORDER — AMLODIPINE BESYLATE 5 MG/1
5 TABLET ORAL DAILY
Qty: 90 TABLET | Refills: 1 | Status: SHIPPED | OUTPATIENT
Start: 2025-04-16 | End: 2025-04-21

## 2025-04-16 NOTE — TELEPHONE ENCOUNTER
Pt called refill line confused as to why Rx was denied. I explained Dr. Scott's note stating he does not approve scripts from the pharmacy. Pt verbalized understanding.    While on the phone I mentioned she was overdue for an appt and I offered to transfer her to scheduling. Pt agreed so I warm transferred her to Gina with scheduling who scheduled pt for 4/21. Please send Rx if appropriate

## 2025-04-21 ENCOUNTER — OFFICE VISIT (OUTPATIENT)
Dept: FAMILY MEDICINE CLINIC | Facility: CLINIC | Age: 55
End: 2025-04-21
Payer: COMMERCIAL

## 2025-04-21 VITALS
DIASTOLIC BLOOD PRESSURE: 88 MMHG | HEART RATE: 75 BPM | TEMPERATURE: 98 F | BODY MASS INDEX: 44.28 KG/M2 | WEIGHT: 262 LBS | RESPIRATION RATE: 16 BRPM | OXYGEN SATURATION: 98 % | SYSTOLIC BLOOD PRESSURE: 148 MMHG

## 2025-04-21 DIAGNOSIS — Z00.00 ANNUAL PHYSICAL EXAM: Primary | ICD-10-CM

## 2025-04-21 DIAGNOSIS — I10 PRIMARY HYPERTENSION: ICD-10-CM

## 2025-04-21 DIAGNOSIS — G20.A1 PARKINSON'S DISEASE, UNSPECIFIED WHETHER DYSKINESIA PRESENT, UNSPECIFIED WHETHER MANIFESTATIONS FLUCTUATE (HCC): ICD-10-CM

## 2025-04-21 DIAGNOSIS — E66.01 MORBID OBESITY WITH BMI OF 40.0-44.9, ADULT (HCC): ICD-10-CM

## 2025-04-21 PROCEDURE — 99214 OFFICE O/P EST MOD 30 MIN: CPT | Performed by: FAMILY MEDICINE

## 2025-04-21 PROCEDURE — 99396 PREV VISIT EST AGE 40-64: CPT | Performed by: FAMILY MEDICINE

## 2025-04-21 RX ORDER — AMLODIPINE AND OLMESARTAN MEDOXOMIL 5; 20 MG/1; MG/1
1 TABLET ORAL DAILY
Qty: 90 TABLET | Refills: 1 | Status: SHIPPED | OUTPATIENT
Start: 2025-04-21

## 2025-04-21 NOTE — ASSESSMENT & PLAN NOTE
BP Readings from Last 3 Encounters:   04/21/25 148/88   03/01/25 134/84   06/26/24 130/92     Blood pressure remains elevated.  Currently on amlodipine 5 mg.  Plan to add olmesartan for combination dose of amlodipine-olmesartan 5-20 mg daily.  Will obtain blood work.  Plan to follow-up results of lab work and recheck blood pressure in 4 to 6 weeks.    Orders:    amlodipine-olmesartan (XIMENA) 5-20 MG; Take 1 tablet by mouth daily    CBC and Platelet; Future    Comprehensive metabolic panel; Future

## 2025-04-21 NOTE — ASSESSMENT & PLAN NOTE
Sinemet 1.5 tablets 3 times daily  [facility].  Sinemet was decreased she began experiencing facial dyskinesia

## 2025-04-21 NOTE — PROGRESS NOTES
Adult Annual Physical  Name: Sandra Bradley      : 1970      MRN: [id number]  Encounter Provider: Justin Scott MD  Encounter Date: 2025   Encounter department: [facility]    :  Assessment & Plan  Annual physical exam         Primary hypertension  BP Readings from Last 3 Encounters:   25 148/88   25 134/84   24 130/92     Blood pressure remains elevated.  Currently on amlodipine 5 mg.  Plan to add olmesartan for combination dose of amlodipine-olmesartan 5-20 mg daily.  Will obtain blood work.  Plan to follow-up results of lab work and recheck blood pressure in 4 to 6 weeks.    Orders:    amlodipine-olmesartan (XIMENA) 5-20 MG; Take 1 tablet by mouth daily    CBC and Platelet; Future    Comprehensive metabolic panel; Future    Parkinson's disease, unspecified whether dyskinesia present, unspecified whether manifestations fluctuate ([facility], [facility] within [facility])  Sinemet 1.5 tablets 3 times daily  [facility].  Sinemet was decreased she began experiencing facial dyskinesia       Morbid obesity with BMI of 40.0-44.9, adult (HCC)      Orders:    Lipid panel; Future        Preventive Screenings:    - Breast cancer screening: screening up-to-date        Annual physical and Problem visit completed today   Orders and plan as above     History of Present Illness     Adult Annual Physical:  Patient presents for annual physical.     Diet and Physical Activity:  - Diet/Nutrition: portion control, low calorie diet and consuming 3-5 servings of fruits/vegetables daily. Monitor with fitness tracker  - Exercise: walking, 3-4 times a week on average and less than 30 minutes on average.    Depression Screening:  - PHQ-2 Score: 0    General Health:  - Sleep: 7-8 hours of sleep on average.  - Hearing: normal hearing bilateral ears.  - Vision: no vision problems.  - Dental: regular dental visits.    /GYN Health:  - Follows with GYN: yes.   - Menopause: postmenopausal.   - Last menstrual cycle:  5/9/2023.   - History of STDs: no  - Contraception: tubal ligation.      Advanced Care Planning:  - Has an advanced directive?: no    - Has a durable medical POA?: no      Review of Systems   Constitutional:  Negative for activity change, fatigue and fever.   Eyes:  Negative for visual disturbance.   Respiratory:  Negative for shortness of breath.    Cardiovascular:  Negative for chest pain.   Gastrointestinal:  Negative for abdominal pain, constipation, diarrhea and nausea.   Endocrine: Negative for cold intolerance and heat intolerance.   Musculoskeletal:  Negative for back pain.   Skin:  Negative for rash.   Neurological:  Negative for tremors and headaches.        Slow gait  Denies any balance issues   Psychiatric/Behavioral:  Negative for confusion.          Objective   /88 (BP Location: Left arm, Patient Position: Sitting, Cuff Size: Large)   Pulse 75   Temp 98 °F (36.7 °C) (Temporal)   Resp 16   Wt 119 kg (262 lb)   LMP 05/09/2023   SpO2 98%   BMI 44.28 kg/m²     Physical Exam  Vitals and nursing note reviewed.   Constitutional:       Appearance: Normal appearance. She is well-developed.   HENT:      Head: Normocephalic and atraumatic.   Cardiovascular:      Rate and Rhythm: Normal rate and regular rhythm.   Pulmonary:      Effort: Pulmonary effort is normal.      Breath sounds: Normal breath sounds.   Abdominal:      General: Bowel sounds are normal.      Palpations: Abdomen is soft.   Musculoskeletal:      Cervical back: Normal range of motion.   Skin:     General: Skin is warm.   Neurological:      General: No focal deficit present.      Mental Status: She is alert.   Psychiatric:         Mood and Affect: Mood normal.         Speech: Speech normal.

## 2025-05-01 LAB
ALBUMIN SERPL-MCNC: 4 G/DL (ref 3.6–5.1)
ALBUMIN/GLOB SERPL: 1.3 (CALC) (ref 1–2.5)
ALP SERPL-CCNC: 93 U/L (ref 37–153)
ALT SERPL-CCNC: 14 U/L (ref 6–29)
AST SERPL-CCNC: 16 U/L (ref 10–35)
BASOPHILS # BLD AUTO: 53 CELLS/UL (ref 0–200)
BASOPHILS NFR BLD AUTO: 0.8 %
BILIRUB SERPL-MCNC: 0.6 MG/DL (ref 0.2–1.2)
BUN SERPL-MCNC: 14 MG/DL (ref 7–25)
BUN/CREAT SERPL: ABNORMAL (CALC) (ref 6–22)
CALCIUM SERPL-MCNC: 9.4 MG/DL (ref 8.6–10.4)
CHLORIDE SERPL-SCNC: 102 MMOL/L (ref 98–110)
CHOLEST SERPL-MCNC: 180 MG/DL
CHOLEST/HDLC SERPL: 3.7 (CALC)
CO2 SERPL-SCNC: 31 MMOL/L (ref 20–32)
CREAT SERPL-MCNC: 0.61 MG/DL (ref 0.5–1.03)
EOSINOPHIL # BLD AUTO: 158 CELLS/UL (ref 15–500)
EOSINOPHIL NFR BLD AUTO: 2.4 %
ERYTHROCYTE [DISTWIDTH] IN BLOOD BY AUTOMATED COUNT: 12.2 % (ref 11–15)
GFR/BSA.PRED SERPLBLD CYS-BASED-ARV: 105 ML/MIN/1.73M2
GLOBULIN SER CALC-MCNC: 3 G/DL (CALC) (ref 1.9–3.7)
GLUCOSE SERPL-MCNC: 101 MG/DL (ref 65–99)
HCT VFR BLD AUTO: 45.3 % (ref 35–45)
HDLC SERPL-MCNC: 49 MG/DL
HGB BLD-MCNC: 14.4 G/DL (ref 11.7–15.5)
LDLC SERPL CALC-MCNC: 96 MG/DL (CALC)
LYMPHOCYTES # BLD AUTO: 2554 CELLS/UL (ref 850–3900)
LYMPHOCYTES NFR BLD AUTO: 38.7 %
MCH RBC QN AUTO: 29.3 PG (ref 27–33)
MCHC RBC AUTO-ENTMCNC: 31.8 G/DL (ref 32–36)
MCV RBC AUTO: 92.3 FL (ref 80–100)
MONOCYTES # BLD AUTO: 634 CELLS/UL (ref 200–950)
MONOCYTES NFR BLD AUTO: 9.6 %
NEUTROPHILS # BLD AUTO: 3201 CELLS/UL (ref 1500–7800)
NEUTROPHILS NFR BLD AUTO: 48.5 %
NONHDLC SERPL-MCNC: 131 MG/DL (CALC)
PLATELET # BLD AUTO: 335 THOUSAND/UL (ref 140–400)
PMV BLD REES-ECKER: 11.3 FL (ref 7.5–12.5)
POTASSIUM SERPL-SCNC: 4.4 MMOL/L (ref 3.5–5.3)
PROT SERPL-MCNC: 7 G/DL (ref 6.1–8.1)
RBC # BLD AUTO: 4.91 MILLION/UL (ref 3.8–5.1)
SODIUM SERPL-SCNC: 140 MMOL/L (ref 135–146)
TRIGL SERPL-MCNC: 234 MG/DL
WBC # BLD AUTO: 6.6 THOUSAND/UL (ref 3.8–10.8)

## 2025-05-04 ENCOUNTER — RESULTS FOLLOW-UP (OUTPATIENT)
Dept: FAMILY MEDICINE CLINIC | Facility: CLINIC | Age: 55
End: 2025-05-04

## 2025-05-13 ENCOUNTER — RA CDI HCC (OUTPATIENT)
Dept: OTHER | Facility: HOSPITAL | Age: 55
End: 2025-05-13

## 2025-05-19 ENCOUNTER — OFFICE VISIT (OUTPATIENT)
Dept: FAMILY MEDICINE CLINIC | Facility: CLINIC | Age: 55
End: 2025-05-19
Payer: COMMERCIAL

## 2025-05-19 VITALS
BODY MASS INDEX: 38.99 KG/M2 | TEMPERATURE: 98 F | DIASTOLIC BLOOD PRESSURE: 82 MMHG | HEART RATE: 78 BPM | OXYGEN SATURATION: 98 % | HEIGHT: 65 IN | RESPIRATION RATE: 18 BRPM | SYSTOLIC BLOOD PRESSURE: 128 MMHG | WEIGHT: 234 LBS

## 2025-05-19 DIAGNOSIS — I10 PRIMARY HYPERTENSION: Primary | ICD-10-CM

## 2025-05-19 PROBLEM — R03.0 ELEVATED BLOOD-PRESSURE READING WITHOUT DIAGNOSIS OF HYPERTENSION: Status: RESOLVED | Noted: 2020-10-19 | Resolved: 2025-05-19

## 2025-05-19 PROCEDURE — 99213 OFFICE O/P EST LOW 20 MIN: CPT | Performed by: FAMILY MEDICINE

## 2025-05-19 NOTE — ASSESSMENT & PLAN NOTE
BP Readings from Last 3 Encounters:   05/19/25 128/82   04/21/25 148/88   03/01/25 134/84   Blood pressure significantly improved since starting amlodipine-olmesartan.  Plan to continue combination blood pressure medicine.  Follow-up in 1 year.

## 2025-05-19 NOTE — PROGRESS NOTES
"Name: Sandra Bradley      : 1970      MRN: [id number]  Encounter Provider: Justin Scott MD  Encounter Date: 2025   Encounter department: [facility]  :  Assessment & Plan  Primary hypertension  BP Readings from Last 3 Encounters:   25 128/82   25 148/88   25 134/84   Blood pressure significantly improved since starting amlodipine-olmesartan.  Plan to continue combination blood pressure medicine.  Follow-up in 1 year.              History of Present Illness   Patient presents with:  Follow-up: 4 week          Review of Systems   Constitutional:  Negative for activity change, fatigue and fever.   Eyes:  Negative for visual disturbance.   Respiratory:  Negative for shortness of breath.    Cardiovascular:  Negative for chest pain.   Gastrointestinal:  Negative for abdominal pain, constipation, diarrhea and nausea.   Endocrine: Negative for cold intolerance and heat intolerance.   Musculoskeletal:  Negative for back pain.   Skin:  Negative for rash.   Neurological:  Negative for headaches.   Psychiatric/Behavioral:  Negative for confusion.        Objective   /82 (BP Location: Left arm, Patient Position: Sitting, Cuff Size: Standard)   Pulse 78   Temp 98 °F (36.7 °C) (Temporal)   Resp 18   Ht 5' 4.5\" (1.638 m)   Wt 106 kg (234 lb)   LMP 2023   SpO2 98%   BMI 39.55 kg/m²      Physical Exam  Vitals and nursing note reviewed.   Constitutional:       Appearance: Normal appearance. She is well-developed.   HENT:      Head: Normocephalic and atraumatic.     Cardiovascular:      Rate and Rhythm: Normal rate and regular rhythm.   Pulmonary:      Effort: Pulmonary effort is normal.      Breath sounds: Normal breath sounds.   Abdominal:      General: Bowel sounds are normal.      Palpations: Abdomen is soft.     Musculoskeletal:      Cervical back: Normal range of motion.     Skin:     General: Skin is warm.     Neurological:      General: No focal deficit " present.      Mental Status: She is alert.     Psychiatric:         Mood and Affect: Mood normal.         Speech: Speech normal.

## 2025-05-25 ENCOUNTER — APPOINTMENT (EMERGENCY)
Dept: RADIOLOGY | Facility: HOSPITAL | Age: 55
End: 2025-05-25
Payer: COMMERCIAL

## 2025-05-25 ENCOUNTER — HOSPITAL ENCOUNTER (EMERGENCY)
Facility: HOSPITAL | Age: 55
Discharge: HOME | End: 2025-05-25
Attending: EMERGENCY MEDICINE | Admitting: EMERGENCY MEDICINE
Payer: COMMERCIAL

## 2025-05-25 VITALS
OXYGEN SATURATION: 99 % | SYSTOLIC BLOOD PRESSURE: 134 MMHG | RESPIRATION RATE: 16 BRPM | BODY MASS INDEX: 43.96 KG/M2 | TEMPERATURE: 97.5 F | HEART RATE: 76 BPM | WEIGHT: 260.14 LBS | DIASTOLIC BLOOD PRESSURE: 65 MMHG

## 2025-05-25 DIAGNOSIS — R55 SYNCOPE: Primary | ICD-10-CM

## 2025-05-25 LAB
ALBUMIN SERPL BCG-MCNC: 4.1 G/DL (ref 3.5–5)
ALP SERPL-CCNC: 92 U/L (ref 34–104)
ALT SERPL W P-5'-P-CCNC: 3 U/L (ref 7–52)
ANION GAP SERPL CALCULATED.3IONS-SCNC: 9 MMOL/L (ref 4–13)
AST SERPL W P-5'-P-CCNC: 17 U/L (ref 13–39)
ATRIAL RATE: 73 BPM
BASOPHILS # BLD AUTO: 0.06 THOUSANDS/ÂΜL (ref 0–0.1)
BASOPHILS NFR BLD AUTO: 1 % (ref 0–1)
BILIRUB SERPL-MCNC: 0.67 MG/DL (ref 0.2–1)
BUN SERPL-MCNC: 15 MG/DL (ref 5–25)
CALCIUM SERPL-MCNC: 9.1 MG/DL (ref 8.4–10.2)
CARDIAC TROPONIN I PNL SERPL HS: <2 NG/L (ref ?–50)
CHLORIDE SERPL-SCNC: 102 MMOL/L (ref 96–108)
CO2 SERPL-SCNC: 27 MMOL/L (ref 21–32)
CREAT SERPL-MCNC: 0.72 MG/DL (ref 0.6–1.3)
EOSINOPHIL # BLD AUTO: 0.12 THOUSAND/ÂΜL (ref 0–0.61)
EOSINOPHIL NFR BLD AUTO: 2 % (ref 0–6)
ERYTHROCYTE [DISTWIDTH] IN BLOOD BY AUTOMATED COUNT: 12.8 % (ref 11.6–15.1)
GFR SERPL CREATININE-BSD FRML MDRD: 93 ML/MIN/1.73SQ M
GLUCOSE SERPL-MCNC: 127 MG/DL (ref 65–140)
HCT VFR BLD AUTO: 43.5 % (ref 34.8–46.1)
HGB BLD-MCNC: 13.9 G/DL (ref 11.5–15.4)
IMM GRANULOCYTES # BLD AUTO: 0.02 THOUSAND/UL (ref 0–0.2)
IMM GRANULOCYTES NFR BLD AUTO: 0 % (ref 0–2)
LIPASE SERPL-CCNC: 13 U/L (ref 11–82)
LYMPHOCYTES # BLD AUTO: 2.51 THOUSANDS/ÂΜL (ref 0.6–4.47)
LYMPHOCYTES NFR BLD AUTO: 32 % (ref 14–44)
MAGNESIUM SERPL-MCNC: 1.9 MG/DL (ref 1.9–2.7)
MCH RBC QN AUTO: 29.3 PG (ref 26.8–34.3)
MCHC RBC AUTO-ENTMCNC: 32 G/DL (ref 31.4–37.4)
MCV RBC AUTO: 92 FL (ref 82–98)
MONOCYTES # BLD AUTO: 0.64 THOUSAND/ÂΜL (ref 0.17–1.22)
MONOCYTES NFR BLD AUTO: 8 % (ref 4–12)
NEUTROPHILS # BLD AUTO: 4.56 THOUSANDS/ÂΜL (ref 1.85–7.62)
NEUTS SEG NFR BLD AUTO: 57 % (ref 43–75)
NRBC BLD AUTO-RTO: 0 /100 WBCS
P AXIS: 63 DEGREES
PLATELET # BLD AUTO: 300 THOUSANDS/UL (ref 149–390)
PMV BLD AUTO: 11.8 FL (ref 8.9–12.7)
POTASSIUM SERPL-SCNC: 3.9 MMOL/L (ref 3.5–5.3)
PR INTERVAL: 182 MS
PROT SERPL-MCNC: 7.3 G/DL (ref 6.4–8.4)
QRS AXIS: 20 DEGREES
QRSD INTERVAL: 86 MS
QT INTERVAL: 396 MS
QTC INTERVAL: 436 MS
RBC # BLD AUTO: 4.75 MILLION/UL (ref 3.81–5.12)
SODIUM SERPL-SCNC: 138 MMOL/L (ref 135–147)
T WAVE AXIS: 47 DEGREES
TSH SERPL DL<=0.05 MIU/L-ACNC: 3.92 UIU/ML (ref 0.45–4.5)
VENTRICULAR RATE: 73 BPM
WBC # BLD AUTO: 7.91 THOUSAND/UL (ref 4.31–10.16)

## 2025-05-25 PROCEDURE — 84443 ASSAY THYROID STIM HORMONE: CPT | Performed by: EMERGENCY MEDICINE

## 2025-05-25 PROCEDURE — 83735 ASSAY OF MAGNESIUM: CPT | Performed by: EMERGENCY MEDICINE

## 2025-05-25 PROCEDURE — 99285 EMERGENCY DEPT VISIT HI MDM: CPT

## 2025-05-25 PROCEDURE — 93010 ELECTROCARDIOGRAM REPORT: CPT | Performed by: INTERNAL MEDICINE

## 2025-05-25 PROCEDURE — 96360 HYDRATION IV INFUSION INIT: CPT

## 2025-05-25 PROCEDURE — 93005 ELECTROCARDIOGRAM TRACING: CPT

## 2025-05-25 PROCEDURE — 36415 COLL VENOUS BLD VENIPUNCTURE: CPT | Performed by: EMERGENCY MEDICINE

## 2025-05-25 PROCEDURE — 96361 HYDRATE IV INFUSION ADD-ON: CPT

## 2025-05-25 PROCEDURE — 85025 COMPLETE CBC W/AUTO DIFF WBC: CPT | Performed by: EMERGENCY MEDICINE

## 2025-05-25 PROCEDURE — 99285 EMERGENCY DEPT VISIT HI MDM: CPT | Performed by: EMERGENCY MEDICINE

## 2025-05-25 PROCEDURE — 83690 ASSAY OF LIPASE: CPT | Performed by: EMERGENCY MEDICINE

## 2025-05-25 PROCEDURE — 71045 X-RAY EXAM CHEST 1 VIEW: CPT

## 2025-05-25 PROCEDURE — 80053 COMPREHEN METABOLIC PANEL: CPT | Performed by: EMERGENCY MEDICINE

## 2025-05-25 PROCEDURE — 84484 ASSAY OF TROPONIN QUANT: CPT | Performed by: EMERGENCY MEDICINE

## 2025-05-25 RX ADMIN — SODIUM CHLORIDE 1000 ML: 0.9 INJECTION, SOLUTION INTRAVENOUS at 11:56

## 2025-05-25 NOTE — DISCHARGE INSTRUCTIONS
Drink plenty of fluids to stay hydrated.  Follow-up with PCP and cardiology.    Return to the emergency department for any new or worsening symptoms.

## 2025-05-25 NOTE — ED PROVIDER NOTES
Time reflects when diagnosis was documented in both MDM as applicable and the Disposition within this note       Time User Action Codes Description Comment    5/25/2025  2:05 PM Norma Box Add [R55] Syncope           ED Disposition       ED Disposition   Discharge    Condition   Stable    Date/Time   Sun May 25, 2025  2:05 PM    Comment   Sandra Bradley discharge to home/self care.                   Assessment & Plan       Medical Decision Making  Amount and/or Complexity of Data Reviewed  Labs: ordered.  Radiology: ordered.        ED Course as of 05/25/25 1823   Sun May 25, 2025   1203 Per my independent interpretation of EKG is normal sinus rhythm with heart rate of 73, narrow QRS, normal axis, intervals reassuring, no STEMI, no evidence of Brugada syndrome, no delta waves.     Workup in the emergency department is overall reassuring.  Presentation is primarily consistent with vasovagal syncopal episode.  Vitals are reassuring.  She received IV fluids.  Upon repeat evaluation, she was able to stand up and walk without any issues, blood pressure was maintained.  Not orthostatic.  She is asymptomatic, she was updated with all results.  Advised PCP and cardiology follow-up, advise hydration, return cautions were discussed, she verbalized understanding and is in agreement with plan.      Medications   sodium chloride 0.9 % bolus 1,000 mL (0 mL Intravenous Stopped 5/25/25 1400)       ED Risk Strat Scores                    No data recorded        SBIRT 20yo+      Flowsheet Row Most Recent Value   Initial Alcohol Screen: US AUDIT-C     1. How often do you have a drink containing alcohol? 1 Filed at: 05/25/2025 1125   2. How many drinks containing alcohol do you have on a typical day you are drinking?  0 Filed at: 05/25/2025 1125   3a. Male UNDER 65: How often do you have five or more drinks on one occasion? 0 Filed at: 05/25/2025 1125   3b. FEMALE Any Age, or MALE 65+: How often do you have 4 or more drinks on one  occassion? 0 Filed at: 05/25/2025 1128   Audit-C Score 1 Filed at: 05/25/2025 1127   JESUSITA: How many times in the past year have you...    Used an illegal drug or used a prescription medication for non-medical reasons? Never Filed at: 05/25/2025 1126                            History of Present Illness       Chief Complaint   Patient presents with    Syncope     Patient here for eval of syncopal episode that lasted a few seconds today. EMS reports family assisted patient down after passing out; pt experienced dizziness prior to the incident. Hx Parkinsons and HTN. Denies CP/N/V/SOB. Has received 100ml NS from EMS , sugar 123.        Past Medical History[1]   Past Surgical History[2]   Family History[3]   Social History[4]   E-Cigarette/Vaping    E-Cigarette Use Never User       E-Cigarette/Vaping Substances    Nicotine No     THC No     CBD No     Flavoring No     Other No     Unknown No       I have reviewed and agree with the history as documented.     54-year-old female presenting to the emergency department with family members for syncope.  She took her medications this morning but did not eat or drink anything this morning, walked 18 minutes to a restaurant, waited in line, sat down waiting for food.  She started feeling dizzy, states the lights were very bright, felt like she was going to pass out.  No palpitations or chest pain or shortness of breath.  Per family member, patient was diaphoretic and was complaining of dizziness, she started talking to him but then stopped.  He called 911, patient then leaned forward and lost consciousness and started to slide off of her chair and son later down onto the floor.  She was unresponsive for about 15 seconds.  She remembers waking up to a person in the restaurant looking over her.  States since then she has been feeling well.  She did get some water to drink.  No history of syncope.  Currently is asymptomatic.  Denies any chest pain at any point.  No shortness of  breath.  No nausea or vomiting.  No fevers or chills.  No sick contacts.        Review of Systems   Constitutional:  Negative for chills and fever.   Respiratory:  Negative for cough and shortness of breath.    Cardiovascular:  Negative for chest pain and palpitations.   Gastrointestinal:  Negative for abdominal pain, nausea and vomiting.   Neurological:  Positive for syncope.           Objective       ED Triage Vitals   Temperature Pulse Blood Pressure Respirations SpO2 Patient Position - Orthostatic VS   05/25/25 1124 05/25/25 1122 05/25/25 1122 05/25/25 1122 05/25/25 1122 05/25/25 1122   97.5 °F (36.4 °C) 76 133/67 16 95 % Sitting      Temp Source Heart Rate Source BP Location FiO2 (%) Pain Score    05/25/25 1122 05/25/25 1122 05/25/25 1122 -- 05/25/25 1122    Oral Monitor Right arm  No Pain      Vitals      Date and Time Temp Pulse SpO2 Resp BP Pain Score FACES Pain Rating User   05/25/25 1345 -- 76 99 % 16 134/65 -- -- DP   05/25/25 1330 -- 81 97 % 16 120/59 -- -- DP   05/25/25 1300 -- 80 98 % 16 119/60 -- -- DP   05/25/25 1230 -- 77 97 % 16 110/58 -- -- DP   05/25/25 1200 -- 77 96 % 16 107/56 -- -- DP   05/25/25 1124 97.5 °F (36.4 °C) -- -- -- -- -- -- DP   05/25/25 1122 -- 76 95 % 16 133/67 No Pain -- DP            Physical Exam  Constitutional:       General: She is not in acute distress.     Appearance: She is not ill-appearing.   HENT:      Head: Normocephalic and atraumatic.      Nose: Nose normal.      Mouth/Throat:      Mouth: Mucous membranes are dry.     Eyes:      Extraocular Movements: Extraocular movements intact.      Pupils: Pupils are equal, round, and reactive to light.       Cardiovascular:      Rate and Rhythm: Normal rate and regular rhythm.   Pulmonary:      Effort: No respiratory distress.      Breath sounds: Normal breath sounds. No wheezing.   Abdominal:      General: There is no distension.      Palpations: Abdomen is soft.      Tenderness: There is no abdominal tenderness.      Musculoskeletal:         General: No swelling or deformity. Normal range of motion.      Cervical back: Normal range of motion and neck supple.     Skin:     General: Skin is warm.      Findings: No erythema.     Neurological:      Mental Status: She is alert and oriented to person, place, and time. Mental status is at baseline.      Cranial Nerves: No cranial nerve deficit.      Sensory: No sensory deficit.      Motor: No weakness.         Results Reviewed       Procedure Component Value Units Date/Time    TSH, 3rd generation with Free T4 reflex [[id number]]  (Normal) Collected: 05/25/25 1155    Lab Status: Final result Specimen: Blood from Arm, Left Updated: 05/25/25 1249     TSH 3RD GENERATON 3.924 uIU/mL     HS Troponin 0hr (reflex protocol) [[id number]]  (Normal) Collected: 05/25/25 1155    Lab Status: Final result Specimen: Blood from Arm, Left Updated: 05/25/25 1240     hs TnI 0hr <2 ng/L     Comprehensive metabolic panel [[id number]]  (Abnormal) Collected: 05/25/25 1155    Lab Status: Final result Specimen: Blood from Arm, Left Updated: 05/25/25 1234     Sodium 138 mmol/L      Potassium 3.9 mmol/L      Chloride 102 mmol/L      CO2 27 mmol/L      ANION GAP 9 mmol/L      BUN 15 mg/dL      Creatinine 0.72 mg/dL      Glucose 127 mg/dL      Calcium 9.1 mg/dL      AST 17 U/L      ALT 3 U/L      Alkaline Phosphatase 92 U/L      Total Protein 7.3 g/dL      Albumin 4.1 g/dL      Total Bilirubin 0.67 mg/dL      eGFR 93 ml/min/1.73sq m     Narrative:      National Kid[employer] Foundation guidelines for Chronic Kidney Disease (CKD):     Stage 1 with normal or high GFR (GFR > 90 mL/min/1.73 square meters)    Stage 2 Mild CKD (GFR = 60-89 mL/min/1.73 square meters)    Stage 3A Moderate CKD (GFR = 45-59 mL/min/1.73 square meters)    Stage 3B Moderate CKD (GFR = 30-44 mL/min/1.73 square meters)    Stage 4 Severe CKD (GFR = 15-29 mL/min/1.73 square meters)    Stage 5 End Stage CKD (GFR <15 mL/min/1.73 square  meters)  Note: GFR calculation is accurate only with a steady state creatinine    Lipase [[id number]]  (Normal) Collected: 05/25/25 1155    Lab Status: Final result Specimen: Blood from Arm, Left Updated: 05/25/25 1234     Lipase 13 u/L     Magnesium [[id number]]  (Normal) Collected: 05/25/25 1155    Lab Status: Final result Specimen: Blood from Arm, Left Updated: 05/25/25 1234     Magnesium 1.9 mg/dL     CBC and differential [[id number]] Collected: 05/25/25 1155    Lab Status: Final result Specimen: Blood from Arm, Left Updated: 05/25/25 1210     WBC 7.91 Thousand/uL      RBC 4.75 Million/uL      Hemoglobin 13.9 g/dL      Hematocrit 43.5 %      MCV 92 fL      MCH 29.3 pg      MCHC 32.0 g/dL      RDW 12.8 %      MPV 11.8 fL      Platelets 300 Thousands/uL      nRBC 0 /100 WBCs      Segmented % 57 %      Immature Grans % 0 %      Lymphocytes % 32 %      Monocytes % 8 %      Eosinophils Relative 2 %      Basophils Relative 1 %      Absolute Neutrophils 4.56 Thousands/µL      Absolute Immature Grans 0.02 Thousand/uL      Absolute Lymphocytes 2.51 Thousands/µL      Absolute Monocytes 0.64 Thousand/µL      Eosinophils Absolute 0.12 Thousand/µL      Basophils Absolute 0.06 Thousands/µL             XR chest 1 view portable   Final Interpretation by Daniel Benson MD (05/25 2984)      No acute cardiopulmonary disease.            Workstation performed: [id number]             Procedures    ED Medication and Procedure Management   Prior to Admission Medications   Prescriptions Last Dose Informant Patient Reported? Taking?   Multiple Vitamin (DAILY VALUE MULTIVITAMIN) TABS  Self Yes No   Sig: Take 1 tablet by mouth in the morning.   Omega-3 Fatty Acids (FISH OIL) 1,000 mg  Self Yes No   Sig: Take 1,000 mg by mouth in the morning.   amlodipine-olmesartan (XIMENA) 5-20 MG  Self No No   Sig: Take 1 tablet by mouth daily   ascorbic acid (VITAMIN C) 500 mg tablet  Self Yes No   Sig: Take 500 mg by mouth in the morning.    carbidopa-levodopa (SINEMET)  mg per tablet  Self Yes No   Sig: Take 1 tablet by mouth in the morning and 1 tablet at noon and 1 tablet in the evening.   glucosamine-chondroitin 500-400 MG tablet  Self Yes No   Sig: Take 1 tablet by mouth in the morning and 1 tablet in the evening and 1 tablet before bedtime.      Facility-Administered Medications: None     Discharge Medication List as of 2025  2:06 PM        CONTINUE these medications which have NOT CHANGED    Details   amlodipine-olmesartan (XIMENA) 5-20 MG Take 1 tablet by mouth daily, Starting Mon 2025, Normal      ascorbic acid (VITAMIN C) 500 mg tablet Take 500 mg by mouth in the morning., Historical Med      carbidopa-levodopa (SINEMET)  mg per tablet Take 1 tablet by mouth in the morning and 1 tablet at noon and 1 tablet in the evening., Starting Thu 2020, Until Mon 2025, Historical Med      glucosamine-chondroitin 500-400 MG tablet Take 1 tablet by mouth in the morning and 1 tablet in the evening and 1 tablet before bedtime., Historical Med      Multiple Vitamin (DAILY VALUE MULTIVITAMIN) TABS Take 1 tablet by mouth in the morning., Historical Med      Omega-3 Fatty Acids (FISH OIL) 1,000 mg Take 1,000 mg by mouth in the morning., Historical Med             ED SEPSIS DOCUMENTATION   Time reflects when diagnosis was documented in both MDM as applicable and the Disposition within this note       Time User Action Codes Description Comment    2025  2:05 PM Norma Box Add [R55] Syncope                    [1]   Past Medical History:  Diagnosis Date    Acne Teen    Arthritis     Self- diagnosed    Difficulty walking 2020    Neurological disorder 2020    Parkinsons disease    Obesity     Parkinson disease (HCC)     Varicella ?    Had it as a kid   [2]   Past Surgical History:  Procedure Laterality Date     SECTION   &     SKIN BIOPSY      Had a mole removed & biopsied - benign    TUBAL  LIGATION  2000    After    [3]   Family History  Problem Relation Name Age of Onset    Cancer Mother Tia Solo         Gall bladder    Heart disease Father Thomas Simmons     Early death Father Thomas Simmons         Heart attack at 38, pace maker    No Known Problems Brother      Breast cancer Neg Hx      Ovarian cancer Neg Hx      Colon cancer Neg Hx     [4]   Social History  Tobacco Use    Smoking status: Never    Smokeless tobacco: Never   Vaping Use    Vaping status: Never Used   Substance Use Topics    Alcohol use: Yes     Alcohol/week: 1.0 standard drink of alcohol     Types: 1 Glasses of wine per week     Comment: social    Drug use: No        Norma Box DO  25 [id number]

## 2025-05-28 ENCOUNTER — OFFICE VISIT (OUTPATIENT)
Dept: CARDIOLOGY CLINIC | Facility: CLINIC | Age: 55
End: 2025-05-28
Payer: COMMERCIAL

## 2025-05-28 VITALS
HEART RATE: 76 BPM | BODY MASS INDEX: 42.49 KG/M2 | SYSTOLIC BLOOD PRESSURE: 116 MMHG | HEIGHT: 65 IN | WEIGHT: 255 LBS | DIASTOLIC BLOOD PRESSURE: 68 MMHG

## 2025-05-28 DIAGNOSIS — R94.31 ABNORMAL EKG: ICD-10-CM

## 2025-05-28 DIAGNOSIS — I10 PRIMARY HYPERTENSION: Primary | ICD-10-CM

## 2025-05-28 DIAGNOSIS — E78.00 HYPERCHOLESTEROLEMIA: ICD-10-CM

## 2025-05-28 DIAGNOSIS — R55 SYNCOPE, UNSPECIFIED SYNCOPE TYPE: ICD-10-CM

## 2025-05-28 PROCEDURE — 99203 OFFICE O/P NEW LOW 30 MIN: CPT | Performed by: INTERNAL MEDICINE

## 2025-05-28 NOTE — PROGRESS NOTES
Cardiology Follow Up    Sandra Bradley  1970  [id number]  [facility]  [address] [facility] [address]  [phone]    1. Primary hypertension        2. Abnormal EKG        3. Syncope, unspecified syncope type        4. Hypercholesterolemia            Interval History: Cardiology consultation.  Records reviewed, imaging was reviewed when available.  54-year-old female who has history of hypertension as well as Parkinson disease.  Patient presented to the emergency room on 5/25 with a syncopal event.  Brief episode, perhaps less than a minute.   prodromes, she felt diaphoretic.  No tonic-clonic activity.  Spontaneous recovery.  No associated symptoms, emergency room visit was mostly unrevealing, it was felt it was possibly vasovagal.  She was given hydration chest x-ray and blood work was unremarkable.  EKG revealed normal sinus rhythm with poor R wave progression across the precordium.  Patient does have history of dyslipidemia lipids this year total cholesterol 180, HDL 49, , triglycerides of 234, she is not on statin lipid-lowering therapy, she takes low-dose omega-3 fatty acids.  Her BMI is 42.  Patient is active, denies any exertional symptoms, her Parkinson disease appears to be well-controlled on current medications, her blood pressure medication was recently increased about a month ago, i.e. couple weeks before her syncopal events.  Current blood pressure is normal at 116/68.  She was not orthostatic in the emergency room.  Patient is non-smoker, there is family history of sudden death in her father at age 38, possibly cardiac, he had a pacemaker done.    Problem List[1]  Past Medical History[2]  Social History     Socioeconomic History    Marital status: /Civil Union     Spouse name: Not on file    Number of children: Not on file    Years of education: Not on file    Highest education level: Not on  file   Occupational History    Not on file   Tobacco Use    Smoking status: Never    Smokeless tobacco: Never   Vaping Use    Vaping status: Never Used   Substance and Sexual Activity    Alcohol use: Yes     Alcohol/week: 1.0 standard drink of alcohol     Types: 1 Glasses of wine per week     Comment: social    Drug use: No    Sexual activity: Yes     Partners: Male     Birth control/protection: Female Sterilization     Comment: Tubal ligation 2000   Other Topics Concern    Not on file   Social History Narrative    Not on file     Social Drivers of Health     Financial Resource Strain: Not on file   Food Insecurity: No Food Insecurity (4/20/2025)    Nursing - Inadequate Food Risk Classification     Worried About Running Out of Food in the Last Year: Never true     Ran Out of Food in the Last Year: Never true     Ran Out of Food in the Last Year: Not on file   Transportation Needs: No Transportation Needs (4/20/2025)    PRAPARE - Transportation     Lack of Transportation (Medical): No     Lack of Transportation (Non-Medical): No   Physical Activity: Not on file   Stress: Not on file   Social Connections: Not on file   Intimate Partner Violence: Not on file   Housing Stability: Low Risk  (4/20/2025)    Housing Stability Vital Sign     Unable to Pay for Housing in the Last Year: No     Number of Times Moved in the Last Year: 0     Homeless in the Last Year: No      Family History[3]  Past Surgical History[4]  Current Medications[5]  No Known Allergies    Labs:  Admission on 05/25/2025, Discharged on 05/25/2025   Component Date Value    Ventricular Rate 05/25/2025 73     Atrial Rate 05/25/2025 73     CT Interval 05/25/2025 182     QRSD Interval 05/25/2025 86     QT Interval 05/25/2025 396     QTC Interval 05/25/2025 436     P Axis 05/25/2025 63     QRS [place] 05/25/2025 20     T Wave Axis 05/25/2025 47     WBC 05/25/2025 7.91     RBC 05/25/2025 4.75     Hemoglobin 05/25/2025 13.9     Hematocrit 05/25/2025 43.5     MCV  05/25/2025 92     MCH 05/25/2025 29.3     MCHC 05/25/2025 32.0     RDW 05/25/2025 12.8     MPV 05/25/2025 11.8     Platelets 05/25/2025 300     nRBC 05/25/2025 0     Segmented % 05/25/2025 57     Immature Grans % 05/25/2025 0     Lymphocytes % 05/25/2025 32     Monocytes % 05/25/2025 8     Eosinophils Relative 05/25/2025 2     Basophils Relative 05/25/2025 1     Absolute Neutrophils 05/25/2025 4.56     Absolute Immature Grans 05/25/2025 0.02     Absolute Lymphocytes 05/25/2025 2.51     Absolute Monocytes 05/25/2025 0.64     Eosinophils Absolute 05/25/2025 0.12     Basophils Absolute 05/25/2025 0.06     Sodium 05/25/2025 138     Potassium 05/25/2025 3.9     Chloride 05/25/2025 102     CO2 05/25/2025 27     ANION GAP 05/25/2025 9     BUN 05/25/2025 15     Creatinine 05/25/2025 0.72     Glucose 05/25/2025 127     Calcium 05/25/2025 9.1     AST 05/25/2025 17     ALT 05/25/2025 3 (L)     Alkaline Phosphatase 05/25/2025 92     Total Protein 05/25/2025 7.3     Albumin 05/25/2025 4.1     Total Bilirubin 05/25/2025 0.67     eGFR 05/25/2025 93     Lipase 05/25/2025 13     hs TnI 0hr 05/25/2025 <2     TSH 3RD GENERATON 05/25/2025 3.924     Magnesium 05/25/2025 1.9    Orders Only on 04/30/2025   Component Date Value    Total Cholesterol 04/30/2025 180     HDL 04/30/2025 49 (L)     Triglycerides 04/30/2025 234 (H)     LDL Calculated 04/30/2025 96     Chol HDLC Ratio 04/30/2025 3.7     Non-HDL Cholesterol 04/30/2025 131 (H)     Glucose, Random 04/30/2025 101 (H)     BUN 04/30/2025 14     Creatinine 04/30/2025 0.61     eGFR 04/30/2025 105     SL AMB BUN/CREATININE RA* 04/30/2025 SEE NOTE:     Sodium 04/30/2025 140     Potassium 04/30/2025 4.4     Chloride 04/30/2025 102     CO2 04/30/2025 31     Calcium 04/30/2025 9.4     Protein, Total 04/30/2025 7.0     Albumin 04/30/2025 4.0     Globulin 04/30/2025 3.0     Albumin/Globulin Ratio 04/30/2025 1.3     TOTAL BILIRUBIN 04/30/2025 0.6     Alkaline Phosphatase 04/30/2025 93     AST  04/30/2025 16     ALT 04/30/2025 14     White Blood Cell Count 04/30/2025 6.6     Red Blood Cell Count 04/30/2025 4.91     Hemoglobin 04/30/2025 14.4     HCT 04/30/2025 45.3 (H)     MCV 04/30/2025 92.3     MCH 04/30/2025 29.3     MCHC 04/30/2025 31.8 (L)     RDW 04/30/2025 12.2     Platelet Count 04/30/2025 335     SL AMB MPV 04/30/2025 11.3     Neutrophils (Absolute) 04/30/2025 3,201     Lymphocytes (Absolute) 04/30/2025 2,554     Monocytes (Absolute) 04/30/2025 634     Eosinophils (Absolute) 04/30/2025 158     Basophils ABS 04/30/2025 53     Neutrophils 04/30/2025 48.5     Lymphocytes 04/30/2025 38.7     Monocytes 04/30/2025 9.6     Eosinophils 04/30/2025 2.4     Basophils PCT 04/30/2025 0.8      Imaging: XR chest 1 view portable  Result Date: 5/25/2025  Narrative: XR CHEST PORTABLE INDICATION: syncope. COMPARISON: None FINDINGS: Clear lungs. No pneumothorax or pleural effusion. Normal cardiomediastinal silhouette. Bones are unremarkable for age. Normal upper abdomen.     Impression: No acute cardiopulmonary disease. Workstation performed: [id number]       Review of Systems:  Review of Systems   Constitutional:  Negative for activity change, chills, diaphoresis, fatigue and unexpected weight change.   HENT:  Negative for hearing loss and nosebleeds.    Eyes:  Negative for visual disturbance.   Respiratory:  Negative for apnea, chest tightness, shortness of breath, wheezing and stridor.    Cardiovascular:  Negative for chest pain, palpitations and leg swelling.   Gastrointestinal:  Negative for abdominal pain, anal bleeding, blood in stool, constipation and diarrhea.   Endocrine: Negative for cold intolerance and heat intolerance.   Genitourinary:  Negative for frequency and hematuria.   Musculoskeletal:  Negative for arthralgias, gait problem and myalgias.   Skin:  Negative for pallor and rash.   Allergic/Immunologic: Negative for immunocompromised state.   Neurological:  Positive for tremors and syncope. Negative  for dizziness, facial asymmetry, weakness and light-headedness.   Hematological:  Does not bruise/bleed easily.   Psychiatric/Behavioral:  Negative for sleep disturbance. The patient is not nervous/anxious.        Physical Exam:  Physical Exam  Vitals reviewed.   Constitutional:       General: She is not in acute distress.     Appearance: She is obese. She is not ill-appearing, toxic-appearing or diaphoretic.   HENT:      Head: Normocephalic.     Eyes:      General: No scleral icterus.     Conjunctiva/sclera: Conjunctivae normal.     Neck:      Vascular: No carotid bruit.     Cardiovascular:      Rate and Rhythm: Normal rate and regular rhythm.      Pulses: Normal pulses.      Heart sounds: Normal heart sounds. No murmur heard.     No friction rub. No gallop.   Pulmonary:      Effort: Pulmonary effort is normal. No respiratory distress.      Breath sounds: Normal breath sounds. No stridor. No wheezing, rhonchi or rales.   Abdominal:      General: There is no distension.      Palpations: Abdomen is soft.      Tenderness: There is no abdominal tenderness.     Musculoskeletal:      Right lower leg: No edema.      Left lower leg: No edema.     Skin:     General: Skin is warm and dry.      Capillary Refill: Capillary refill takes less than 2 seconds.      Coloration: Skin is not jaundiced or pale.      Findings: No bruising or erythema.     Neurological:      Mental Status: She is alert and oriented to person, place, and time.     Psychiatric:         Mood and Affect: Mood normal.         Behavior: Behavior normal.         Thought Content: Thought content normal.         Judgment: Judgment normal.         Discussion/Summary: Syncopal event, possibly vasovagal, no structural heart disease by clinical examination, EKG is mildly abnormal mostly positional, will proceed with noninvasive evaluation, follow-up if any abnormalities or recurrence of symptoms.    This note was completed in part utilizing m-modal fluency direct  voice recognition software.   Grammatical errors, random word insertion, spelling mistakes, and incomplete sentences may be an occasional consequence of the system secondary to software limitations, ambient noise and hardware issues. At the time of dictation, efforts were made to edit, clarify and /or correct errors.  Please read the chart carefully and recognize, using context, where substitutions have occurred.  If you have any questions or concerns about the context, text or information contained within the body of this dictation, please contact myself, the provider, for further clarification.        [1]   Patient Active Problem List  Diagnosis    Morbid obesity due to excess calories (HCC)    Calcific tendonitis of left shoulder    Parkinson's disease (HCC)    Primary hypertension    Abnormal EKG    Syncope    Hypercholesterolemia   [2]   Past Medical History:  Diagnosis Date    Acne Teen    Arthritis     Self- diagnosed    Difficulty walking 2020    Neurological disorder 2020    Parkinsons disease    Obesity     Parkinson disease (HCC)     Varicella ?    Had it as a kid   [3]   Family History  Problem Relation Name Age of Onset    Cancer Mother Tia Solo         Gall bladder    Heart disease Father Thomas Simmons     Early death Father Thomas Simmons         Heart attack at 38, pace maker    No Known Problems Brother      Breast cancer Neg Hx      Ovarian cancer Neg Hx      Colon cancer Neg Hx     [4]   Past Surgical History:  Procedure Laterality Date     SECTION   &     SKIN BIOPSY      Had a mole removed & biopsied - benign    TUBAL LIGATION      After    [5]   Current Outpatient Medications:     amlodipine-olmesartan (XIMENA) 5-20 MG, Take 1 tablet by mouth daily, Disp: 90 tablet, Rfl: 1    ascorbic acid (VITAMIN C) 500 mg tablet, Take 500 mg by mouth in the morning., Disp: , Rfl:     carbidopa-levodopa (SINEMET)  mg per tablet, Take 1.5 tablets by  mouth in the morning and 1.5 tablets at noon and 1.5 tablets in the evening., Disp: , Rfl:     glucosamine-chondroitin 500-400 MG tablet, Take 1 tablet by mouth in the morning and 1 tablet in the evening and 1 tablet before bedtime., Disp: , Rfl:     Multiple Vitamin (DAILY VALUE MULTIVITAMIN) TABS, Take 1 tablet by mouth in the morning., Disp: , Rfl:     Omega-3 Fatty Acids (FISH OIL) 1,000 mg, Take 1,000 mg by mouth in the morning., Disp: , Rfl:

## 2025-06-07 ENCOUNTER — HOSPITAL ENCOUNTER (EMERGENCY)
Facility: HOSPITAL | Age: 55
Discharge: HOME | End: 2025-06-07
Attending: EMERGENCY MEDICINE | Admitting: EMERGENCY MEDICINE
Payer: COMMERCIAL

## 2025-06-07 ENCOUNTER — APPOINTMENT (EMERGENCY)
Dept: RADIOLOGY | Facility: HOSPITAL | Age: 55
End: 2025-06-07
Payer: COMMERCIAL

## 2025-06-07 VITALS
OXYGEN SATURATION: 96 % | HEART RATE: 72 BPM | RESPIRATION RATE: 18 BRPM | DIASTOLIC BLOOD PRESSURE: 60 MMHG | TEMPERATURE: 98.3 F | SYSTOLIC BLOOD PRESSURE: 127 MMHG

## 2025-06-07 DIAGNOSIS — R07.89 ATYPICAL CHEST PAIN: ICD-10-CM

## 2025-06-07 DIAGNOSIS — K20.90 ACUTE ESOPHAGITIS: Primary | ICD-10-CM

## 2025-06-07 LAB
ALBUMIN SERPL BCG-MCNC: 4.3 G/DL (ref 3.5–5)
ALP SERPL-CCNC: 93 U/L (ref 34–104)
ALT SERPL W P-5'-P-CCNC: 12 U/L (ref 7–52)
ANION GAP SERPL CALCULATED.3IONS-SCNC: 6 MMOL/L (ref 4–13)
AST SERPL W P-5'-P-CCNC: 40 U/L (ref 13–39)
ATRIAL RATE: 70 BPM
BASOPHILS # BLD AUTO: 0.04 THOUSANDS/ÂΜL (ref 0–0.1)
BASOPHILS NFR BLD AUTO: 0 % (ref 0–1)
BILIRUB SERPL-MCNC: 0.47 MG/DL (ref 0.2–1)
BUN SERPL-MCNC: 14 MG/DL (ref 5–25)
CALCIUM SERPL-MCNC: 9.6 MG/DL (ref 8.4–10.2)
CARDIAC TROPONIN I PNL SERPL HS: <2 NG/L (ref ?–50)
CARDIAC TROPONIN I PNL SERPL HS: <2 NG/L (ref ?–50)
CHLORIDE SERPL-SCNC: 102 MMOL/L (ref 96–108)
CO2 SERPL-SCNC: 31 MMOL/L (ref 21–32)
CREAT SERPL-MCNC: 0.69 MG/DL (ref 0.6–1.3)
EOSINOPHIL # BLD AUTO: 0.13 THOUSAND/ÂΜL (ref 0–0.61)
EOSINOPHIL NFR BLD AUTO: 1 % (ref 0–6)
ERYTHROCYTE [DISTWIDTH] IN BLOOD BY AUTOMATED COUNT: 12.9 % (ref 11.6–15.1)
GFR SERPL CREATININE-BSD FRML MDRD: 97 ML/MIN/1.73SQ M
GLUCOSE SERPL-MCNC: 110 MG/DL (ref 65–140)
HCT VFR BLD AUTO: 43.6 % (ref 34.8–46.1)
HGB BLD-MCNC: 14 G/DL (ref 11.5–15.4)
IMM GRANULOCYTES # BLD AUTO: 0.02 THOUSAND/UL (ref 0–0.2)
IMM GRANULOCYTES NFR BLD AUTO: 0 % (ref 0–2)
LYMPHOCYTES # BLD AUTO: 3.24 THOUSANDS/ÂΜL (ref 0.6–4.47)
LYMPHOCYTES NFR BLD AUTO: 35 % (ref 14–44)
MCH RBC QN AUTO: 29.3 PG (ref 26.8–34.3)
MCHC RBC AUTO-ENTMCNC: 32.1 G/DL (ref 31.4–37.4)
MCV RBC AUTO: 91 FL (ref 82–98)
MONOCYTES # BLD AUTO: 0.78 THOUSAND/ÂΜL (ref 0.17–1.22)
MONOCYTES NFR BLD AUTO: 8 % (ref 4–12)
NEUTROPHILS # BLD AUTO: 5.14 THOUSANDS/ÂΜL (ref 1.85–7.62)
NEUTS SEG NFR BLD AUTO: 56 % (ref 43–75)
NRBC BLD AUTO-RTO: 0 /100 WBCS
P AXIS: 67 DEGREES
PLATELET # BLD AUTO: 339 THOUSANDS/UL (ref 149–390)
PMV BLD AUTO: 11.1 FL (ref 8.9–12.7)
POTASSIUM SERPL-SCNC: 4.2 MMOL/L (ref 3.5–5.3)
PR INTERVAL: 184 MS
PROT SERPL-MCNC: 7.8 G/DL (ref 6.4–8.4)
QRS AXIS: 42 DEGREES
QRSD INTERVAL: 94 MS
QT INTERVAL: 404 MS
QTC INTERVAL: 436 MS
RBC # BLD AUTO: 4.78 MILLION/UL (ref 3.81–5.12)
SODIUM SERPL-SCNC: 139 MMOL/L (ref 135–147)
T WAVE AXIS: 57 DEGREES
VENTRICULAR RATE: 70 BPM
WBC # BLD AUTO: 9.35 THOUSAND/UL (ref 4.31–10.16)

## 2025-06-07 PROCEDURE — 80053 COMPREHEN METABOLIC PANEL: CPT

## 2025-06-07 PROCEDURE — 85025 COMPLETE CBC W/AUTO DIFF WBC: CPT

## 2025-06-07 PROCEDURE — 99285 EMERGENCY DEPT VISIT HI MDM: CPT

## 2025-06-07 PROCEDURE — 93005 ELECTROCARDIOGRAM TRACING: CPT

## 2025-06-07 PROCEDURE — 93010 ELECTROCARDIOGRAM REPORT: CPT | Performed by: INTERNAL MEDICINE

## 2025-06-07 PROCEDURE — 71046 X-RAY EXAM CHEST 2 VIEWS: CPT

## 2025-06-07 PROCEDURE — 99285 EMERGENCY DEPT VISIT HI MDM: CPT | Performed by: EMERGENCY MEDICINE

## 2025-06-07 PROCEDURE — 84484 ASSAY OF TROPONIN QUANT: CPT

## 2025-06-07 PROCEDURE — 36415 COLL VENOUS BLD VENIPUNCTURE: CPT

## 2025-06-07 RX ORDER — PANTOPRAZOLE SODIUM 40 MG/1
40 TABLET, DELAYED RELEASE ORAL DAILY
Qty: 30 TABLET | Refills: 0 | Status: SHIPPED | OUTPATIENT
Start: 2025-06-07

## 2025-06-07 RX ORDER — MAGNESIUM HYDROXIDE/ALUMINUM HYDROXICE/SIMETHICONE 120; 1200; 1200 MG/30ML; MG/30ML; MG/30ML
30 SUSPENSION ORAL ONCE
Status: COMPLETED | OUTPATIENT
Start: 2025-06-07 | End: 2025-06-07

## 2025-06-07 RX ADMIN — ALUMINUM HYDROXIDE, MAGNESIUM HYDROXIDE, AND DIMETHICONE 30 ML: 200; 20; 200 SUSPENSION ORAL at 12:00

## 2025-06-07 NOTE — ED PROVIDER NOTES
Time reflects when diagnosis was documented in both MDM as applicable and the Disposition within this note       Time User Action Codes Description Comment    6/7/2025  2:09 PM Kaleb Cao Add [K20.90] Acute esophagitis     6/7/2025  2:09 PM Kaleb Cao Add [R07.89] Atypical chest pain           ED Disposition       ED Disposition   Discharge    Condition   Stable    Date/Time   Sat Jun 7, 2025  2:09 PM    Comment   Sandra Bradley discharge to home/self care.                   Assessment & Plan       Medical Decision Making        Initial ED assessment:    54-year-old female, was in normal state of health developed a chest burning which she describes as heartburn was associated with a sour taste in her mouth, but also into a full body diaphoresis.    Pathology at risk for includes but is not limited to:   High concern for cardiac etiology.   alternative pathology is GERD/esophagitis, peptic ulcer disease.  Abdomen is nontender so doubt pancreatitis cholecystitis.    Initial ED plan:    Long conversation about cardiac versus noncardiac etiologies of her discomfort.  She does have features of both.  The diaphoresis does make me concern for cardiac etiology but she also had a sour taste in her mouth which she has had with previous heartburn.  There was no exertional component to her discomfort, there is no radiation of the pain.  She does have cardiac risk factors which include hypertension hyperlipidemia and obesity.  No known history of CAD no known history of diabetes        Final ED summary/disposition:   After evaluation and workup in the emergency department,   delta Trope negative will discharge home Protonix patient to follow-up PCP and cardiology.,  Referral placed for elevated heart score referral to cardiology.    Amount and/or Complexity of Data Reviewed  Radiology: independent interpretation performed.    Risk  OTC drugs.  Prescription drug management.        ED Course as of 06/07/25 1411   Sat Michael  07, 2025   1216 First troponin negative, patient had symptoms just prior to arrival so we will proceed with second troponin.   1237 Updated patient on first round of blood work.  Agrees to stay for second set.   1409 Delta troponin negative.  Patient discharged will follow with outpatient cardiology.       Medications   aluminum-magnesium hydroxide-simethicone (MAALOX) oral suspension 30 mL (30 mL Oral Given 6/7/25 1200)       ED Risk Strat Scores   HEART Risk Score      Flowsheet Row Most Recent Value   Heart Score Risk Calculator    History 1 Filed at: 06/07/2025 1204   ECG 0 Filed at: 06/07/2025 1204   Age 1 Filed at: 06/07/2025 1204   Risk Factors 2 Filed at: 06/07/2025 1204   Troponin 0 Filed at: 06/07/2025 1204   HEART Score 4 Filed at: 06/07/2025 1204          HEART Risk Score      Flowsheet Row Most Recent Value   Heart Score Risk Calculator    History 1 Filed at: 06/07/2025 1204   ECG 0 Filed at: 06/07/2025 1204   Age 1 Filed at: 06/07/2025 1204   Risk Factors 2 Filed at: 06/07/2025 1204   Troponin 0 Filed at: 06/07/2025 1204   HEART Score 4 Filed at: 06/07/2025 1204                      No data recorded                            History of Present Illness       Chief Complaint   Patient presents with    Medical Problem     Pt reports she was sitting at [employer] and began to have severe heartburn, diaphoretic, and hypotension. Pt denies symptoms at this time. Seen for similar episode a few weeks ago.          Past Medical History[1]   Past Surgical History[2]   Family History[3]   Social History[4]   E-Cigarette/Vaping    E-Cigarette Use Never User       E-Cigarette/Vaping Substances    Nicotine No     THC No     CBD No     Flavoring No     Other No     Unknown No       I have reviewed and agree with the history as documented.           54-year-old female presents with what she is describing as heartburn.  States she will woke up this morning, ate breakfast, went to a [employer] sale, was there for about  2 hours and felt normal, started to feel some burning in the epigastric region over the course of about 5 minutes progressed to a severe discomfort described as burning started to radiate towards the back of her mouth.  States she had a foul taste in her mouth a metallic acid like taste.  She typically does not get heartburn and this is unusual for her.   She states while the pain was starting to get severe she did break out into a sweat.  Described it as a full body type sweat this was unusual for her as well.  No associated shortness of breath.  No radiation of this burning discomfort.  Has recently follow-up with cardiology as scheduled for a stress test in July, this was for a syncopal type episode.  Has not had any more of these episodes since.      Medical Problem  Associated symptoms: chest pain    Associated symptoms: no abdominal pain, no congestion, no cough, no diarrhea, no fever, no headaches, no nausea, no rash, no shortness of breath, no sore throat, no vomiting and no wheezing        Review of Systems   Constitutional:  Negative for activity change, chills, diaphoresis and fever.   HENT:  Negative for congestion, sinus pressure and sore throat.    Eyes:  Negative for pain and visual disturbance.   Respiratory:  Negative for cough, chest tightness, shortness of breath, wheezing and stridor.    Cardiovascular:  Positive for chest pain. Negative for palpitations.   Gastrointestinal:  Negative for abdominal distention, abdominal pain, constipation, diarrhea, nausea and vomiting.   Genitourinary:  Negative for dysuria and frequency.   Musculoskeletal:  Negative for neck pain and neck stiffness.   Skin:  Negative for rash.   Neurological:  Negative for dizziness, speech difficulty, light-headedness, numbness and headaches.           Objective       ED Triage Vitals [06/07/25 1133]   Temperature Pulse Blood Pressure Respirations SpO2 Patient Position - Orthostatic VS   98.3 °F (36.8 °C) 72 127/60 18 96 %  Lying      Temp Source Heart Rate Source BP Location FiO2 (%) Pain Score    Oral Monitor Right arm -- No Pain      Vitals      Date and Time Temp Pulse SpO2 Resp BP Pain Score FACES Pain Rating User   06/07/25 1133 98.3 °F (36.8 °C) 72 96 % 18 127/60 No Pain -- MM            Physical Exam  Vitals reviewed.   Constitutional:       General: She is not in acute distress.     Appearance: She is well-developed. She is not diaphoretic.   HENT:      Head: Normocephalic and atraumatic.      Right Ear: External ear normal.      Left Ear: External ear normal.      Nose: Nose normal.     Eyes:      General:         Right eye: No discharge.         Left eye: No discharge.      Pupils: Pupils are equal, round, and reactive to light.     Neck:      Trachea: No tracheal deviation.     Cardiovascular:      Rate and Rhythm: Normal rate and regular rhythm.      Heart sounds: Normal heart sounds. No murmur heard.  Pulmonary:      Effort: Pulmonary effort is normal. No respiratory distress.      Breath sounds: Normal breath sounds. No stridor.   Abdominal:      General: There is no distension.      Palpations: Abdomen is soft.      Tenderness: There is no abdominal tenderness. There is no guarding or rebound.     Musculoskeletal:         General: Normal range of motion.      Cervical back: Normal range of motion and neck supple.     Skin:     General: Skin is warm and dry.      Coloration: Skin is not pale.      Findings: No erythema.     Neurological:      General: No focal deficit present.      Mental Status: She is alert and oriented to person, place, and time.         Results Reviewed       Procedure Component Value Units Date/Time    HS Troponin I 2hr [[id number]] Collected: 06/07/25 1328    Lab Status: Final result Specimen: Blood from Arm, Left Updated: 06/07/25 1402     hs TnI 2hr <2 ng/L      Delta 2hr hsTnI --    HS Troponin I 4hr [[id number]]     Lab Status: No result Specimen: Blood     HS Troponin 0hr (reflex protocol)  [[id number]]  (Normal) Collected: 06/07/25 1136    Lab Status: Final result Specimen: Blood from Arm, Left Updated: 06/07/25 1212     hs TnI 0hr <2 ng/L     Comprehensive metabolic panel [[id number]]  (Abnormal) Collected: 06/07/25 1136    Lab Status: Final result Specimen: Blood from Arm, Left Updated: 06/07/25 1205     Sodium 139 mmol/L      Potassium 4.2 mmol/L      Chloride 102 mmol/L      CO2 31 mmol/L      ANION GAP 6 mmol/L      BUN 14 mg/dL      Creatinine 0.69 mg/dL      Glucose 110 mg/dL      Calcium 9.6 mg/dL      AST 40 U/L      ALT 12 U/L      Alkaline Phosphatase 93 U/L      Total Protein 7.8 g/dL      Albumin 4.3 g/dL      Total Bilirubin 0.47 mg/dL      eGFR 97 ml/min/1.73sq m     Narrative:      National Kid[employer] Disease Foundation guidelines for Chronic Kidney Disease (CKD):     Stage 1 with normal or high GFR (GFR > 90 mL/min/1.73 square meters)    Stage 2 Mild CKD (GFR = 60-89 mL/min/1.73 square meters)    Stage 3A Moderate CKD (GFR = 45-59 mL/min/1.73 square meters)    Stage 3B Moderate CKD (GFR = 30-44 mL/min/1.73 square meters)    Stage 4 Severe CKD (GFR = 15-29 mL/min/1.73 square meters)    Stage 5 End Stage CKD (GFR <15 mL/min/1.73 square meters)  Note: GFR calculation is accurate only with a steady state creatinine    CBC and differential [[id number]] Collected: 06/07/25 1136    Lab Status: Final result Specimen: Blood from Arm, Left Updated: 06/07/25 1142     WBC 9.35 Thousand/uL      RBC 4.78 Million/uL      Hemoglobin 14.0 g/dL      Hematocrit 43.6 %      MCV 91 fL      MCH 29.3 pg      MCHC 32.1 g/dL      RDW 12.9 %      MPV 11.1 fL      Platelets 339 Thousands/uL      nRBC 0 /100 WBCs      Segmented % 56 %      Immature Grans % 0 %      Lymphocytes % 35 %      Monocytes % 8 %      Eosinophils Relative 1 %      Basophils Relative 0 %      Absolute Neutrophils 5.14 Thousands/µL      Absolute Immature Grans 0.02 Thousand/uL      Absolute Lymphocytes 3.24 Thousands/µL      Absolute Monocytes  0.78 Thousand/µL      Eosinophils Absolute 0.13 Thousand/µL      Basophils Absolute 0.04 Thousands/µL             XR chest 2 views   ED Interpretation by Kaleb Cao DO (06/07 1225)       No acute pathology.          ECG 12 Lead Documentation Only    Date/Time: 6/7/2025 12:04 PM    Performed by: Kaleb Cao DO  Authorized by: Kaleb Cao DO    ECG reviewed by me, the ED Provider: yes    Patient location:  ED  Previous ECG:     Previous ECG:  Compared to current    Comparison ECG info:  5.25.25    Similarity:  No change  Interpretation:     Interpretation: normal    Rate:     ECG rate:  70    ECG rate assessment: normal    Rhythm:     Rhythm: sinus rhythm    Ectopy:     Ectopy: none    QRS:     QRS axis:  Normal    QRS intervals:  Normal  Conduction:     Conduction: normal    ST segments:     ST segments:  Normal  T waves:     T waves: normal        ED Medication and Procedure Management   Prior to Admission Medications   Prescriptions Last Dose Informant Patient Reported? Taking?   Multiple Vitamin (DAILY VALUE MULTIVITAMIN) TABS  Self Yes No   Sig: Take 1 tablet by mouth in the morning.   Omega-3 Fatty Acids (FISH OIL) 1,000 mg  Self Yes No   Sig: Take 1,000 mg by mouth in the morning.   amlodipine-olmesartan (XIMENA) 5-20 MG  Self No No   Sig: Take 1 tablet by mouth daily   ascorbic acid (VITAMIN C) 500 mg tablet  Self Yes No   Sig: Take 500 mg by mouth in the morning.   carbidopa-levodopa (SINEMET)  mg per tablet  Self Yes No   Sig: Take 1.5 tablets by mouth in the morning and 1.5 tablets at noon and 1.5 tablets in the evening.   glucosamine-chondroitin 500-400 MG tablet  Self Yes No   Sig: Take 1 tablet by mouth in the morning and 1 tablet in the evening and 1 tablet before bedtime.      Facility-Administered Medications: None     Patient's Medications   Discharge Prescriptions    PANTOPRAZOLE (PROTONIX) 40 MG TABLET    Take 1 tablet (40 mg total) by mouth daily       Start Date:  2025  End Date: --       Order Dose: 40 mg       Quantity: 30 tablet    Refills: 0       ED SEPSIS DOCUMENTATION   Time reflects when diagnosis was documented in both MDM as applicable and the Disposition within this note       Time User Action Codes Description Comment    2025  2:09 PM Kaleb Cao [K20.90] Acute esophagitis     2025  2:09 PM Kaleb Cao [R07.89] Atypical chest pain                    [1]   Past Medical History:  Diagnosis Date    Acne Teen    Arthritis     Self- diagnosed    Difficulty walking 2020    Neurological disorder 2020    Parkinsons disease    Obesity     Parkinson disease (HCC)     Varicella ?    Had it as a kid   [2]   Past Surgical History:  Procedure Laterality Date     SECTION   &     SKIN BIOPSY      Had a mole removed & biopsied - benign    TUBAL LIGATION      After    [3]   Family History  Problem Relation Name Age of Onset    Cancer Mother Tia Solo         Gall bladder    Heart disease Father Thomas Simmons     Early death Father Thomas Simmons         Heart attack at 38, pace maker    No Known Problems Brother      Breast cancer Neg Hx      Ovarian cancer Neg Hx      Colon cancer Neg Hx     [4]   Social History  Tobacco Use    Smoking status: Never    Smokeless tobacco: Never   Vaping Use    Vaping status: Never Used   Substance Use Topics    Alcohol use: Yes     Alcohol/week: 1.0 standard drink of alcohol     Types: 1 Glasses of wine per week     Comment: social    Drug use: No        Kaleb Cao DO  25 1410

## 2025-06-09 ENCOUNTER — OFFICE VISIT (OUTPATIENT)
Dept: FAMILY MEDICINE CLINIC | Facility: CLINIC | Age: 55
End: 2025-06-09
Payer: COMMERCIAL

## 2025-06-09 VITALS
OXYGEN SATURATION: 99 % | HEART RATE: 79 BPM | TEMPERATURE: 98.1 F | BODY MASS INDEX: 42.35 KG/M2 | WEIGHT: 254.5 LBS | RESPIRATION RATE: 18 BRPM | DIASTOLIC BLOOD PRESSURE: 66 MMHG | SYSTOLIC BLOOD PRESSURE: 116 MMHG

## 2025-06-09 DIAGNOSIS — I10 PRIMARY HYPERTENSION: Primary | ICD-10-CM

## 2025-06-09 DIAGNOSIS — K20.90 ACUTE ESOPHAGITIS: ICD-10-CM

## 2025-06-09 PROCEDURE — 99214 OFFICE O/P EST MOD 30 MIN: CPT | Performed by: FAMILY MEDICINE

## 2025-06-09 RX ORDER — OLMESARTAN MEDOXOMIL 20 MG/1
20 TABLET ORAL DAILY
Qty: 30 TABLET | Refills: 5 | Status: SHIPPED | OUTPATIENT
Start: 2025-06-09

## 2025-06-09 NOTE — PROGRESS NOTES
Name: Sandra Bradley      : 1970      MRN: [id number]  Encounter Provider: Justin Scott MD  Encounter Date: 2025   Encounter department: [facility]  :  Assessment & Plan  Acute esophagitis           Met with cardiology.  Testing ordered  Seen in the ER on seventh diagnosed with esophagitis.  Started on Protonix.  Patient reports never having the symptoms before.  Pain and burning in her stomach and started to get sweaty similar to when she fainted.  Denies any black stools or red stools.  She was given 30 pantoprazole.  Plan to continue 40 mg of Protonix for the next month.  Can then consider decreasing to 20 mg.  If any more episodes will refer to gastroenterology for EGD.  No red flag symptoms at this time.  Follow-up cardiology test.  Blood pressure remains borderline low and with syncopal episode plan on adjusting her blood pressure medication.  Was previously running high with amlodipine 5 mg we added olmesartan.  Has responded to well to the olmesartan.  For the time being we will switch her to olmesartan 20 mg daily only stop amlodipine.  Medication sent to pharmacy     History of Present Illness {?Quick Links Encounters * My Last Note * Last Note in Specialty * Snapshot * Since Last Visit * History :26502}  Patient presents with:  Follow-up:   Acute esophagitis    Syncope          Review of Systems    Objective {?Quick Links Trend Vitals * Enter New Vitals * Results Review * Timeline (Adult) * Labs * Imaging * Cardiology * Procedures * Lung Cancer Screening * Surgical eConsent :35582}  /66 (BP Location: Left arm, Patient Position: Sitting, Cuff Size: Large)   Pulse 79   Temp 98.1 °F (36.7 °C) (Temporal)   Resp 18   Wt 115 kg (254 lb 8 oz)   LMP 2023   SpO2 99%   BMI 42.35 kg/m²      Physical Exam  {Administrative / Billing Section (Optional):67706}

## 2025-06-11 PROBLEM — R07.89 OTHER CHEST PAIN: Status: ACTIVE | Noted: 2025-06-11

## 2025-06-11 NOTE — PROGRESS NOTES
Cardiology  ED Follow Up   Office Visit Note  Sandra Bradley   54 y.o.   female   MRN: [id number]  [facility]  [address]  [address]  [facility] [address] [phone]    PCP: Jusitn Scott MD  Cardiologist:           Summary of Plan:  Heart healthy diet: Mediterranean or DASH.  Education provided  Check hemoglobin A1c given hypertriglyceridemia  Check Lp A- pretest education provided that this is a marker of cardiovascular risk not a target of therapy.  Follow-up with Dr. Hodge 2 months, after previous testing that was ordered, is complete  Continue periodic home blood pressure monitoring  Referral to GI for colorectal cancer screening          Assessment/Plan  Chest pain.  Recent ED admission  ACS ruled out  Atypical symptoms.  Prescribed a proton pump inhibitor for suspected GI symptoms  EKG stress test 2025 negative at peak exercise.  I reviewed this test result with her.  Holter, and echocardiogram are pending  Abnormal EKG  Syncope.  Recent ED admission 2025  possibly vasovagal etiology  Echo: Pending  Holter: Pending  EKG stress test 2025 negative at peak exercise  Hypertension  /72  On olmesartan 20 mg daily  Periodic home BP monitoring  DASH diet  Dyslipidemia  2025: Total cholesterol 180, calculated LDL 96, non-.  ASCVD risk score 3.4%.  Taking off-the-shelf fish oil  We will check a LP(a).  Pretest education provided  Morbid obesity, BMI 42.35 kg/m²  Parkinson's disease, on Sinemet  Family history premature death: Her father  at 38 years old possibly cardiac.  He had a pacemaker.  Cardiac testing  TTE ordered, pending  Holter monitor: Ordered, pending  EKG stress test 2025: Modified Lencho protocol time 7 minutes 1 second.  85% MPHR, 4.6 METS of activity.  No angina.  Terminated secondary to fatigue.  EKG negative for ischemia.            HPI  Sandra Bradley is a 54 y.o.year old female with hypertension,  hyperlipidemia, morbid obesity, BMI 42.27 kg/m² and Parkinson's disease.  She was evaluated by Dr. Hodge 5/28/2025 5/28/2025 OV Dr. Hodge  Per his note:  Interval History: Cardiology consultation.  Records reviewed, imaging was reviewed when available.  54-year-old female who has history of hypertension as well as Parkinson disease.  Patient presented to the emergency room on 5/25 with a syncopal event.  Brief episode, perhaps less than a minute.   prodromes, she felt diaphoretic.  No tonic-clonic activity.  Spontaneous recovery.  No associated symptoms, emergency room visit was mostly unrevealing, it was felt it was possibly vasovagal.  She was given hydration chest x-ray and blood work was unremarkable.  EKG revealed normal sinus rhythm with poor R wave progression across the precordium.  Patient does have history of dyslipidemia lipids this year total cholesterol 180, HDL 49, , triglycerides of 234, she is not on statin lipid-lowering therapy, she takes low-dose omega-3 fatty acids.  Her BMI is 42.  Patient is active, denies any exertional symptoms, her Parkinson disease appears to be well-controlled on current medications, her blood pressure medication was recently increased about a month ago, i.e. couple weeks before her syncopal events.  Current blood pressure is normal at 116/68.  She was not orthostatic in the emergency room.  Patient is non-smoker, there is family history of sudden death in her father at age 38, possibly cardiac, he had a pacemaker done.     An EKG stress test ,Holter monitor and echocardiogram were requested      6/7/25 ED visit   CC: Heartburn.  Sour taste in her mouth.  Also diaphoresis.  Workup unremarkable ;troponins negative; EKG nonischemic  Recommend outpatient follow-up with cardiology  She was prescribed Protonix    6/16/25  ED follow-up:  EKG stress test 6/13/2025: Modified Lencho protocol time 7 minutes 1 second.  85% MPHR, 4.6 METS of activity.  No angina.   Terminated secondary to fatigue.  EKG negative for ischemia.    ED follow-up.  She is now on a proton pump inhibitor.  She has had no further episodes of heartburn.  She had antihypertensive therapy adjusted per her PCP.  She checks her blood pressure at home.  Her numbers are satisfactory, goal less than 130/80.  She denies palpitations.  I reviewed her stress test report with her.  An echo and Holter are scheduled for July, in a few weeks.  I recommend checking an LP(a), given her family history of premature death.  I also recommend checking hemoglobin A1c to screen for diabetes, given hypertriglyceridemia.  She will return in a few weeks after testing to follow-up with Dr. Hodge.  If she has any intercurrent recurrent episodes of chest pain, advised to contact us promptly, or return to the ED, for additional testing.            Review of Systems   Constitutional: Negative for chills.   Cardiovascular:  Negative for chest pain, claudication, cyanosis, dyspnea on exertion, irregular heartbeat, leg swelling, near-syncope, orthopnea, palpitations, paroxysmal nocturnal dyspnea and syncope.   Respiratory:  Negative for cough and shortness of breath.    Gastrointestinal:  Negative for heartburn and nausea.   Neurological:  Negative for dizziness, focal weakness, headaches, light-headedness and weakness.   All other systems reviewed and are negative.        Assessment  Diagnoses and all orders for this visit:    Primary hypertension    Hypercholesterolemia    Morbid obesity due to excess calories (HCC)    Abnormal EKG    Parkinson's disease, unspecified whether dyskinesia present, unspecified whether manifestations fluctuate (HCC)    Hypertriglyceridemia  -     Hemoglobin A1C; Future    Family history of early death  -     Lipoprotein A (LPA); Future    Other orders  -     Ambulatory referral to Cardiology        Past Medical History[1]    Past Surgical History[2]        Allergies  Allergies[3]      Medications  Current  Medications[4]      Social History     Socioeconomic History    Marital status: /Civil Union     Spouse name: Not on file    Number of children: Not on file    Years of education: Not on file    Highest education level: Not on file   Occupational History    Not on file   Tobacco Use    Smoking status: Never    Smokeless tobacco: Never   Vaping Use    Vaping status: Never Used   Substance and Sexual Activity    Alcohol use: Yes     Alcohol/week: 1.0 standard drink of alcohol     Types: 1 Glasses of wine per week     Comment: social    Drug use: No    Sexual activity: Yes     Partners: Male     Birth control/protection: Female Sterilization     Comment: Tubal ligation 2000   Other Topics Concern    Not on file   Social History Narrative    Not on file     Social Drivers of Health     Financial Resource Strain: Not on file   Food Insecurity: No Food Insecurity (4/20/2025)    Nursing - Inadequate Food Risk Classification     Worried About Running Out of Food in the Last Year: Never true     Ran Out of Food in the Last Year: Never true     Ran Out of Food in the Last Year: Not on file   Transportation Needs: No Transportation Needs (4/20/2025)    PRAPARE - Transportation     Lack of Transportation (Medical): No     Lack of Transportation (Non-Medical): No   Physical Activity: Not on file   Stress: Not on file   Social Connections: Not on file   Intimate Partner Violence: Not on file   Housing Stability: Low Risk  (4/20/2025)    Housing Stability Vital Sign     Unable to Pay for Housing in the Last Year: No     Number of Times Moved in the Last Year: 0     Homeless in the Last Year: No       Family History[5]    Physical Exam  Vitals and nursing note reviewed.   Constitutional:       General: She is not in acute distress.     Appearance: She is obese. She is not diaphoretic.   HENT:      Head: Normocephalic and atraumatic.     Eyes:      Conjunctiva/sclera: Conjunctivae normal.       Cardiovascular:      Rate and  "Rhythm: Normal rate and regular rhythm.      Pulses: Intact distal pulses.      Heart sounds: Normal heart sounds.   Pulmonary:      Effort: Pulmonary effort is normal.      Breath sounds: Normal breath sounds.   Abdominal:      General: Bowel sounds are normal.      Palpations: Abdomen is soft.     Musculoskeletal:         General: Normal range of motion.      Cervical back: Normal range of motion and neck supple.     Skin:     General: Skin is warm and dry.     Neurological:      Mental Status: She is alert and oriented to person, place, and time.       Vitals: Blood pressure 122/72, pulse 73, weight 116 kg (254 lb 12.8 oz), last menstrual period 05/09/2023, SpO2 99%, not currently breastfeeding.   Wt Readings from Last 3 Encounters:   06/16/25 116 kg (254 lb 12.8 oz)   06/13/25 115 kg (254 lb)   06/09/25 115 kg (254 lb 8 oz)           Labs & Results:  Lab Results   Component Value Date    WBC 9.35 06/07/2025    HGB 14.0 06/07/2025    HCT 43.6 06/07/2025    MCV 91 06/07/2025     06/07/2025     No results found for: \"BNP\"  No components found for: \"CHEM\"  No results found for: \"CKTOTAL\", \"TROPONINI\", \"TROPONINT\", \"CKMBINDEX\"  No results found for this or any previous visit.    No results found for this or any previous visit.    No valid procedures specified.  No results found for this or any previous visit.                This note was completed in part utilizing [email or web address] direct voice recognition software.   Grammatical errors, random word insertion, spelling mistakes, and incomplete sentences may be an occasional consequence of the system secondary to software limitations, ambient noise and hardware issues. At the time of dictation, efforts were made to edit, clarify and /or correct errors.  Please read the chart carefully and recognize, using context, where substitutions have occurred.  If you have any questions or concerns about the context, text or information contained within the body of this " dictation, please contact myself, the provider, for further clarification           [1]   Past Medical History:  Diagnosis Date    Acne Teen    Arthritis     Self- diagnosed    Difficulty walking 2020    Neurological disorder 2020    Parkinsons disease    Obesity     Parkinson disease (HCC)     Syncope     Fainted, sent to ER    Varicella ?    Had it as a kid   [2]   Past Surgical History:  Procedure Laterality Date     SECTION   &     SKIN BIOPSY      Had a mole removed & biopsied - benign    TUBAL LIGATION      After    [3] No Known Allergies  [4]   Current Outpatient Medications:     ascorbic acid (VITAMIN C) 500 mg tablet, Take 500 mg by mouth in the morning., Disp: , Rfl:     carbidopa-levodopa (SINEMET)  mg per tablet, Take 1.5 tablets by mouth in the morning and 1.5 tablets at noon and 1.5 tablets in the evening., Disp: , Rfl:     glucosamine-chondroitin 500-400 MG tablet, Take 1 tablet by mouth in the morning and 1 tablet in the evening and 1 tablet before bedtime., Disp: , Rfl:     Multiple Vitamin (DAILY VALUE MULTIVITAMIN) TABS, Take 1 tablet by mouth in the morning., Disp: , Rfl:     olmesartan (BENICAR) 20 mg tablet, Take 1 tablet (20 mg total) by mouth daily, Disp: 30 tablet, Rfl: 5    Omega-3 Fatty Acids (FISH OIL) 1,000 mg, Take 1,000 mg by mouth in the morning., Disp: , Rfl:     pantoprazole (PROTONIX) 40 mg tablet, Take 1 tablet (40 mg total) by mouth daily, Disp: 30 tablet, Rfl: 0  [5]   Family History  Problem Relation Name Age of Onset    Cancer Mother Tia Solo         Gall bladder    Heart disease Father Thomas Simmons     Early death Father Thomas Simmons         Heart attack at 38, pace maker    No Known Problems Brother      Breast cancer Neg Hx      Ovarian cancer Neg Hx      Colon cancer Neg Hx

## 2025-06-12 NOTE — PROGRESS NOTES
Name: Sandra Bradley      : 1970      MRN: [id number]  Encounter Provider: Justin Scott MD  Encounter Date: 2025   Encounter department: [facility]  :  Assessment & Plan  Acute esophagitis  Continue omeprazole 40 mg daily.  Ultimately would like to decrease the dose.  In 1 month can decrease to 20 mg.  If patient continues to have symptoms will place referral to gastroenterology for EGD.  She does not have any red flag symptoms.  Pain has improved       Primary hypertension  Blood pressure has been running borderline low with recent episode of syncope.  Plan to stop amlodipine.  Continue olmesartan 20 mg daily.  Continue monitoring blood pressure closely.    Orders:    olmesartan (BENICAR) 20 mg tablet; Take 1 tablet (20 mg total) by mouth daily           History of Present Illness   Patient presents with:  Follow-up:   Acute esophagitis    Syncope    Met with cardiology.  Testing ordered  Seen in the ER on seventh diagnosed with esophagitis.  Started on Protonix.  Patient reports never having the symptoms before.  Pain and burning in her stomach and started to get sweaty similar to when she fainted.  Denies any black stools or red stools.  She was given Protonix.  Doing well since that time.  Blood pressure continues to run borderline low.      Review of Systems   Constitutional:  Negative for activity change, fatigue and fever.   Eyes:  Negative for visual disturbance.   Respiratory:  Negative for shortness of breath.    Cardiovascular:  Negative for chest pain.   Gastrointestinal:  Negative for abdominal pain, constipation, diarrhea and nausea.   Endocrine: Negative for cold intolerance and heat intolerance.   Musculoskeletal:  Negative for back pain.   Skin:  Negative for rash.   Neurological:  Positive for light-headedness. Negative for headaches.   Psychiatric/Behavioral:  Negative for confusion.        Objective   /66 (BP Location: Left arm, Patient Position:  Sitting, Cuff Size: Large)   Pulse 79   Temp 98.1 °F (36.7 °C) (Temporal)   Resp 18   Wt 115 kg (254 lb 8 oz)   LMP 05/09/2023   SpO2 99%   BMI 42.35 kg/m²      Physical Exam  Vitals and nursing note reviewed.   Constitutional:       Appearance: Normal appearance. She is well-developed.   HENT:      Head: Normocephalic and atraumatic.     Cardiovascular:      Rate and Rhythm: Normal rate and regular rhythm.   Pulmonary:      Effort: Pulmonary effort is normal.      Breath sounds: Normal breath sounds.   Abdominal:      General: Bowel sounds are normal.      Palpations: Abdomen is soft.     Musculoskeletal:      Cervical back: Normal range of motion.     Skin:     General: Skin is warm.     Neurological:      General: No focal deficit present.      Mental Status: She is alert.     Psychiatric:         Mood and Affect: Mood normal.         Speech: Speech normal.

## 2025-06-12 NOTE — ASSESSMENT & PLAN NOTE
Blood pressure has been running borderline low with recent episode of syncope.  Plan to stop amlodipine.  Continue olmesartan 20 mg daily.  Continue monitoring blood pressure closely.    Orders:    olmesartan (BENICAR) 20 mg tablet; Take 1 tablet (20 mg total) by mouth daily

## 2025-06-13 ENCOUNTER — HOSPITAL ENCOUNTER (OUTPATIENT)
Dept: NON INVASIVE DIAGNOSTICS | Facility: CLINIC | Age: 55
Discharge: HOME | End: 2025-06-13
Attending: INTERNAL MEDICINE
Payer: COMMERCIAL

## 2025-06-13 VITALS
WEIGHT: 254 LBS | SYSTOLIC BLOOD PRESSURE: 138 MMHG | BODY MASS INDEX: 42.32 KG/M2 | HEART RATE: 96 BPM | DIASTOLIC BLOOD PRESSURE: 82 MMHG | OXYGEN SATURATION: 99 % | HEIGHT: 65 IN

## 2025-06-13 DIAGNOSIS — I10 PRIMARY HYPERTENSION: ICD-10-CM

## 2025-06-13 DIAGNOSIS — R94.31 ABNORMAL EKG: ICD-10-CM

## 2025-06-13 DIAGNOSIS — R55 SYNCOPE, UNSPECIFIED SYNCOPE TYPE: ICD-10-CM

## 2025-06-13 PROBLEM — G20.A1 PARKINSON'S DISEASE (HCC): Status: RESOLVED | Noted: 2022-12-07 | Resolved: 2025-06-13

## 2025-06-13 LAB
CHEST PAIN STATEMENT: NORMAL
MAX DIASTOLIC BP: 90 MMHG
MAX HR PERCENT: 85 %
MAX HR: 141 BPM
MAX PREDICTED HEART RATE: 164 BPM
PROTOCOL NAME: NORMAL
RATE PRESSURE PRODUCT: NORMAL
REASON FOR TERMINATION: NORMAL
SL CV STRESS RECOVERY BP: NORMAL MMHG
SL CV STRESS RECOVERY HR: 96 BPM
SL CV STRESS RECOVERY O2 SAT: 99 %
SL CV STRESS STAGE REACHED: 1
STRESS ANGINA INDEX: 0
STRESS BASELINE BP: NORMAL MMHG
STRESS BASELINE HR: 96 BPM
STRESS O2 SAT REST: 99 %
STRESS PEAK HR: 141 BPM
STRESS POST ESTIMATED WORKLOAD: 4.6 METS
STRESS POST EXERCISE DUR MIN: 7 MIN
STRESS POST EXERCISE DUR MIN: 7 MIN
STRESS POST EXERCISE DUR SEC: 1 SEC
STRESS POST EXERCISE DUR SEC: 1 SEC
STRESS POST O2 SAT PEAK: 99 %
STRESS POST PEAK BP: 144 MMHG
STRESS POST PEAK HR: 141 BPM
STRESS POST PEAK SYSTOLIC BP: 162 MMHG
TARGET HR FORMULA: NORMAL
TEST INDICATION: NORMAL

## 2025-06-13 PROCEDURE — 93017 CV STRESS TEST TRACING ONLY: CPT

## 2025-06-13 PROCEDURE — 93016 CV STRESS TEST SUPVJ ONLY: CPT | Performed by: INTERNAL MEDICINE

## 2025-06-13 PROCEDURE — 93018 CV STRESS TEST I&R ONLY: CPT | Performed by: INTERNAL MEDICINE

## 2025-06-16 ENCOUNTER — OFFICE VISIT (OUTPATIENT)
Dept: CARDIOLOGY CLINIC | Facility: CLINIC | Age: 55
End: 2025-06-16
Payer: COMMERCIAL

## 2025-06-16 VITALS
OXYGEN SATURATION: 99 % | HEART RATE: 73 BPM | SYSTOLIC BLOOD PRESSURE: 122 MMHG | BODY MASS INDEX: 42.4 KG/M2 | DIASTOLIC BLOOD PRESSURE: 72 MMHG | WEIGHT: 254.8 LBS

## 2025-06-16 DIAGNOSIS — Z84.89 FAMILY HISTORY OF EARLY DEATH: ICD-10-CM

## 2025-06-16 DIAGNOSIS — E78.1 HYPERTRIGLYCERIDEMIA: ICD-10-CM

## 2025-06-16 DIAGNOSIS — G20.A1 PARKINSON'S DISEASE, UNSPECIFIED WHETHER DYSKINESIA PRESENT, UNSPECIFIED WHETHER MANIFESTATIONS FLUCTUATE (HCC): ICD-10-CM

## 2025-06-16 DIAGNOSIS — R94.31 ABNORMAL EKG: ICD-10-CM

## 2025-06-16 DIAGNOSIS — E78.00 HYPERCHOLESTEROLEMIA: ICD-10-CM

## 2025-06-16 DIAGNOSIS — E66.01 MORBID OBESITY DUE TO EXCESS CALORIES (HCC): ICD-10-CM

## 2025-06-16 DIAGNOSIS — I10 PRIMARY HYPERTENSION: Primary | ICD-10-CM

## 2025-06-16 LAB
CHEST PAIN STATEMENT: NORMAL
MAX DIASTOLIC BP: 90 MMHG
MAX PREDICTED HEART RATE: 164 BPM
PROTOCOL NAME: NORMAL
REASON FOR TERMINATION: NORMAL
STRESS POST EXERCISE DUR MIN: 7 MIN
STRESS POST EXERCISE DUR SEC: 1 SEC
STRESS POST PEAK HR: 141 BPM
STRESS POST PEAK SYSTOLIC BP: 162 MMHG
TARGET HR FORMULA: NORMAL
TEST INDICATION: NORMAL

## 2025-06-16 PROCEDURE — 99214 OFFICE O/P EST MOD 30 MIN: CPT | Performed by: NURSE PRACTITIONER

## 2025-06-16 NOTE — LETTER
June 16, 2025     Justin Scott MD  [address] [address]    Patient: Sandra Bradley   YOB: 1970   Date of Visit: 6/16/2025       Dear Dr. Justin Scott MD:    Thank you for referring Sandra Bradley to me for evaluation. Below are my notes for this consultation.    If you have questions, please do not hesitate to call me. I look forward to following your patient along with you.         Sincerely,        ERIC Tran        CC: No Recipients    ERIC Tran  6/16/2025  7:39 AM  Sign when Signing Visit  Cardiology  ED Follow Up   Office Visit Note  Sandra Bradley   54 y.o.   female   MRN: [id number]  [facility] [place]  [address]  [address]  [place] [address]  [phone]    PCP: Justin Scott MD  Cardiologist:           Summary of Plan:  Heart healthy diet: Mediterranean or DASH.  Education provided  Check hemoglobin A1c given hypertriglyceridemia  Check Lp A- pretest education provided that this is a marker of cardiovascular risk not a target of therapy.  Follow-up with Dr. Hodge 2 months, after previous testing that was ordered, is complete  Continue periodic home blood pressure monitoring  Referral to GI for colorectal cancer screening          Assessment/Plan  Chest pain.  Recent ED admission  ACS ruled out  Atypical symptoms.  Prescribed a proton pump inhibitor for suspected GI symptoms  EKG stress test 6/13/2025 negative at peak exercise.  I reviewed this test result with her.  Holter, and echocardiogram are pending  Abnormal EKG  Syncope.  Recent ED admission 5/25/2025  possibly vasovagal etiology  Echo: Pending  Holter: Pending  EKG stress test 6/13/2025 negative at peak exercise  Hypertension  /72  On olmesartan 20 mg daily  Periodic home BP monitoring  DASH diet  Dyslipidemia  4/30/2025: Total cholesterol 180, calculated LDL 96, non-.  ASCVD risk score 3.4%.  Taking off-the-shelf fish  oil  We will check a LP(a).  Pretest education provided  Morbid obesity, BMI 42.35 kg/m²  Parkinson's disease, on Sinemet  Family history premature death: Her father  at 38 years old possibly cardiac.  He had a pacemaker.  Cardiac testing  TTE ordered, pending  Holter monitor: Ordered, pending  EKG stress test 2025: Modified Lencho protocol time 7 minutes 1 second.  85% MPHR, 4.6 METS of activity.  No angina.  Terminated secondary to fatigue.  EKG negative for ischemia.            SYDNIE Bradley is a 54 y.o.year old female with hypertension, hyperlipidemia, morbid obesity, BMI 42.27 kg/m² and Parkinson's disease.  She was evaluated by Dr. Hodge 2025 OV Dr. Hodge  Per his note:  Interval History: Cardiology consultation.  Records reviewed, imaging was reviewed when available.  54-year-old female who has history of hypertension as well as Parkinson disease.  Patient presented to the emergency room on  with a syncopal event.  Brief episode, perhaps less than a minute.   prodromes, she felt diaphoretic.  No tonic-clonic activity.  Spontaneous recovery.  No associated symptoms, emergency room visit was mostly unrevealing, it was felt it was possibly vasovagal.  She was given hydration chest x-ray and blood work was unremarkable.  EKG revealed normal sinus rhythm with poor R wave progression across the precordium.  Patient does have history of dyslipidemia lipids this year total cholesterol 180, HDL 49, , triglycerides of 234, she is not on statin lipid-lowering therapy, she takes low-dose omega-3 fatty acids.  Her BMI is 42.  Patient is active, denies any exertional symptoms, her Parkinson disease appears to be well-controlled on current medications, her blood pressure medication was recently increased about a month ago, i.e. couple weeks before her syncopal events.  Current blood pressure is normal at 116/68.  She was not orthostatic in the emergency room.  Patient is non-smoker,  there is family history of sudden death in her father at age 38, possibly cardiac, he had a pacemaker done.     An EKG stress test ,Holter monitor and echocardiogram were requested      6/7/25 ED visit   CC: Heartburn.  Sour taste in her mouth.  Also diaphoresis.  Workup unremarkable ;troponins negative; EKG nonischemic  Recommend outpatient follow-up with cardiology  She was prescribed Protonix    6/16/25  ED follow-up:  EKG stress test 6/13/2025: Modified Lencho protocol time 7 minutes 1 second.  85% MPHR, 4.6 METS of activity.  No angina.  Terminated secondary to fatigue.  EKG negative for ischemia.    ED follow-up.  She is now on a proton pump inhibitor.  She has had no further episodes of heartburn.  She had antihypertensive therapy adjusted per her PCP.  She checks her blood pressure at home.  Her numbers are satisfactory, goal less than 130/80.  She denies palpitations.  I reviewed her stress test report with her.  An echo and Holter are scheduled for July, in a few weeks.  I recommend checking an LP(a), given her family history of premature death.  I also recommend checking hemoglobin A1c to screen for diabetes, given hypertriglyceridemia.  She will return in a few weeks after testing to follow-up with Dr. Hodge.  If she has any intercurrent recurrent episodes of chest pain, advised to contact us promptly, or return to the ED, for additional testing.            Review of Systems   Constitutional: Negative for chills.   Cardiovascular:  Negative for chest pain, claudication, cyanosis, dyspnea on exertion, irregular heartbeat, leg swelling, near-syncope, orthopnea, palpitations, paroxysmal nocturnal dyspnea and syncope.   Respiratory:  Negative for cough and shortness of breath.    Gastrointestinal:  Negative for heartburn and nausea.   Neurological:  Negative for dizziness, focal weakness, headaches, light-headedness and weakness.   All other systems reviewed and are negative.        Assessment  Diagnoses and  all orders for this visit:    Primary hypertension    Hypercholesterolemia    Morbid obesity due to excess calories (HCC)    Abnormal EKG    Parkinson's disease, unspecified whether dyskinesia present, unspecified whether manifestations fluctuate (HCC)    Hypertriglyceridemia  -     Hemoglobin A1C; Future    Family history of early death  -     Lipoprotein A (LPA); Future    Other orders  -     Ambulatory referral to Cardiology        Past Medical History[1]    Past Surgical History[2]        Allergies  Allergies[3]      Medications  Current Medications[4]      Social History     Socioeconomic History   • Marital status: /Civil Union     Spouse name: Not on file   • Number of children: Not on file   • Years of education: Not on file   • Highest education level: Not on file   Occupational History   • Not on file   Tobacco Use   • Smoking status: Never   • Smokeless tobacco: Never   Vaping Use   • Vaping status: Never Used   Substance and Sexual Activity   • Alcohol use: Yes     Alcohol/week: 1.0 standard drink of alcohol     Types: 1 Glasses of wine per week     Comment: social   • Drug use: No   • Sexual activity: Yes     Partners: Male     Birth control/protection: Female Sterilization     Comment: Tubal ligation 2000   Other Topics Concern   • Not on file   Social History Narrative   • Not on file     Social Drivers of Health     Financial Resource Strain: Not on file   Food Insecurity: No Food Insecurity (4/20/2025)    Nursing - Inadequate Food Risk Classification    • Worried About Running Out of Food in the Last Year: Never true    • Ran Out of Food in the Last Year: Never true    • Ran Out of Food in the Last Year: Not on file   Transportation Needs: No Transportation Needs (4/20/2025)    PRAPARE - Transportation    • Lack of Transportation (Medical): No    • Lack of Transportation (Non-Medical): No   Physical Activity: Not on file   Stress: Not on file   Social Connections: Not on file   Intimate  "Partner Violence: Not on file   Housing Stability: Low Risk  (4/20/2025)    Housing Stability Vital Sign    • Unable to Pay for Housing in the Last Year: No    • Number of Times Moved in the Last Year: 0    • Homeless in the Last Year: No       Family History[5]    Physical Exam  Vitals and nursing note reviewed.   Constitutional:       General: She is not in acute distress.     Appearance: She is obese. She is not diaphoretic.   HENT:      Head: Normocephalic and atraumatic.     Eyes:      Conjunctiva/sclera: Conjunctivae normal.       Cardiovascular:      Rate and Rhythm: Normal rate and regular rhythm.      Pulses: Intact distal pulses.      Heart sounds: Normal heart sounds.   Pulmonary:      Effort: Pulmonary effort is normal.      Breath sounds: Normal breath sounds.   Abdominal:      General: Bowel sounds are normal.      Palpations: Abdomen is soft.     Musculoskeletal:         General: Normal range of motion.      Cervical back: Normal range of motion and neck supple.     Skin:     General: Skin is warm and dry.     Neurological:      Mental Status: She is alert and oriented to person, place, and time.       Vitals: Blood pressure 122/72, pulse 73, weight 116 kg (254 lb 12.8 oz), last menstrual period 05/09/2023, SpO2 99%, not currently breastfeeding.   Wt Readings from Last 3 Encounters:   06/16/25 116 kg (254 lb 12.8 oz)   06/13/25 115 kg (254 lb)   06/09/25 115 kg (254 lb 8 oz)           Labs & Results:  Lab Results   Component Value Date    WBC 9.35 06/07/2025    HGB 14.0 06/07/2025    HCT 43.6 06/07/2025    MCV 91 06/07/2025     06/07/2025     No results found for: \"BNP\"  No components found for: \"CHEM\"  No results found for: \"CKTOTAL\", \"TROPONINI\", \"TROPONINT\", \"CKMBINDEX\"  No results found for this or any previous visit.    No results found for this or any previous visit.    No valid procedures specified.  No results found for this or any previous visit.                This note was completed " in part utilizing [employer] fluency direct voice recognition software.   Grammatical errors, random word insertion, spelling mistakes, and incomplete sentences may be an occasional consequence of the system secondary to software limitations, ambient noise and hardware issues. At the time of dictation, efforts were made to edit, clarify and /or correct errors.  Please read the chart carefully and recognize, using context, where substitutions have occurred.  If you have any questions or concerns about the context, text or information contained within the body of this dictation, please contact myself, the provider, for further clarification           [1]   Past Medical History:  Diagnosis Date   • Acne Teen   • Arthritis     Self- diagnosed   • Difficulty walking 2020   • Neurological disorder 2020    Parkinsons disease   • Obesity    • Parkinson disease (HCC)    • Syncope     Fainted, sent to ER   • Varicella ?    Had it as a kid   [2]   Past Surgical History:  Procedure Laterality Date   •  SECTION   &    • SKIN BIOPSY      Had a mole removed & biopsied - benign   • TUBAL LIGATION      After    [3] No Known Allergies  [4]   Current Outpatient Medications:   •  ascorbic acid (VITAMIN C) 500 mg tablet, Take 500 mg by mouth in the morning., Disp: , Rfl:   •  carbidopa-levodopa (SINEMET)  mg per tablet, Take 1.5 tablets by mouth in the morning and 1.5 tablets at noon and 1.5 tablets in the evening., Disp: , Rfl:   •  glucosamine-chondroitin 500-400 MG tablet, Take 1 tablet by mouth in the morning and 1 tablet in the evening and 1 tablet before bedtime., Disp: , Rfl:   •  Multiple Vitamin (DAILY VALUE MULTIVITAMIN) TABS, Take 1 tablet by mouth in the morning., Disp: , Rfl:   •  olmesartan (BENICAR) 20 mg tablet, Take 1 tablet (20 mg total) by mouth daily, Disp: 30 tablet, Rfl: 5  •  Omega-3 Fatty Acids (FISH OIL) 1,000 mg, Take 1,000 mg by mouth in the morning.,  Disp: , Rfl:   •  pantoprazole (PROTONIX) 40 mg tablet, Take 1 tablet (40 mg total) by mouth daily, Disp: 30 tablet, Rfl: 0  [5]   Family History  Problem Relation Name Age of Onset   • Cancer Mother Tia Solo         Gall bladder   • Heart disease Father Thomas Simmons    • Early death Father Thomas Simmons         Heart attack at 38, pace maker   • No Known Problems Brother     • Breast cancer Neg Hx     • Ovarian cancer Neg Hx     • Colon cancer Neg Hx          [1]  Past Medical History:  Diagnosis Date   • Acne Teen   • Arthritis     Self- diagnosed   • Difficulty walking 2020   • Neurological disorder 2020    Parkinsons disease   • Obesity    • Parkinson disease (HCC)    • Syncope     Fainted, sent to ER   • Varicella ?    Had it as a kid   [2]  Past Surgical History:  Procedure Laterality Date   •  SECTION   &    • SKIN BIOPSY      Had a mole removed & biopsied - benign   • TUBAL LIGATION      After    [3]  No Known Allergies  [4]    Current Outpatient Medications:   •  ascorbic acid (VITAMIN C) 500 mg tablet, Take 500 mg by mouth in the morning., Disp: , Rfl:   •  carbidopa-levodopa (SINEMET)  mg per tablet, Take 1.5 tablets by mouth in the morning and 1.5 tablets at noon and 1.5 tablets in the evening., Disp: , Rfl:   •  glucosamine-chondroitin 500-400 MG tablet, Take 1 tablet by mouth in the morning and 1 tablet in the evening and 1 tablet before bedtime., Disp: , Rfl:   •  Multiple Vitamin (DAILY VALUE MULTIVITAMIN) TABS, Take 1 tablet by mouth in the morning., Disp: , Rfl:   •  olmesartan (BENICAR) 20 mg tablet, Take 1 tablet (20 mg total) by mouth daily, Disp: 30 tablet, Rfl: 5  •  Omega-3 Fatty Acids (FISH OIL) 1,000 mg, Take 1,000 mg by mouth in the morning., Disp: , Rfl:   •  pantoprazole (PROTONIX) 40 mg tablet, Take 1 tablet (40 mg total) by mouth daily, Disp: 30 tablet, Rfl: 0  [5]  Family History  Problem Relation Name Age of Onset    • Cancer Mother Tia Solo         Gall bladder   • Heart disease Father Thomas Simmons    • Early death Father Thomas Simmons         Heart attack at 38, pace maker   • No Known Problems Brother     • Breast cancer Neg Hx     • Ovarian cancer Neg Hx     • Colon cancer Neg Hx

## 2025-07-01 DIAGNOSIS — I10 PRIMARY HYPERTENSION: ICD-10-CM

## 2025-07-02 RX ORDER — OLMESARTAN MEDOXOMIL 20 MG/1
20 TABLET ORAL DAILY
Qty: 90 TABLET | Refills: 1 | Status: SHIPPED | OUTPATIENT
Start: 2025-07-02

## 2025-07-03 DIAGNOSIS — K20.90 ACUTE ESOPHAGITIS: ICD-10-CM

## 2025-07-07 RX ORDER — PANTOPRAZOLE SODIUM 40 MG/1
40 TABLET, DELAYED RELEASE ORAL DAILY
Qty: 30 TABLET | Refills: 0 | Status: SHIPPED | OUTPATIENT
Start: 2025-07-07

## 2025-07-09 ENCOUNTER — HOSPITAL ENCOUNTER (OUTPATIENT)
Dept: NON INVASIVE DIAGNOSTICS | Facility: CLINIC | Age: 55
Discharge: HOME | End: 2025-07-09
Attending: INTERNAL MEDICINE
Payer: COMMERCIAL

## 2025-07-09 VITALS
HEART RATE: 73 BPM | BODY MASS INDEX: 42.32 KG/M2 | SYSTOLIC BLOOD PRESSURE: 122 MMHG | DIASTOLIC BLOOD PRESSURE: 72 MMHG | WEIGHT: 254 LBS | HEIGHT: 65 IN

## 2025-07-09 DIAGNOSIS — R55 SYNCOPE, UNSPECIFIED SYNCOPE TYPE: ICD-10-CM

## 2025-07-09 DIAGNOSIS — R94.31 ABNORMAL EKG: ICD-10-CM

## 2025-07-09 DIAGNOSIS — I10 PRIMARY HYPERTENSION: ICD-10-CM

## 2025-07-09 LAB
AORTIC ROOT: 2.9 CM
ASCENDING AORTA: 3.2 CM
BSA FOR ECHO PROCEDURE: 2.19 M2
E WAVE DECELERATION TIME: 203 MS
E/A RATIO: 0.96
FRACTIONAL SHORTENING: 33 (ref 28–44)
INTERVENTRICULAR SEPTUM IN DIASTOLE (PARASTERNAL SHORT AXIS VIEW): 1 CM
INTERVENTRICULAR SEPTUM: 1 CM (ref 0.6–1.1)
LAAS-AP2: 17.7 CM2
LAAS-AP4: 19.5 CM2
LEFT ATRIUM SIZE: 4 CM
LEFT ATRIUM VOLUME (MOD BIPLANE): 50 ML
LEFT ATRIUM VOLUME INDEX (MOD BIPLANE): 22.8 ML/M2
LEFT INTERNAL DIMENSION IN SYSTOLE: 3.1 CM (ref 2.1–4)
LEFT VENTRICULAR INTERNAL DIMENSION IN DIASTOLE: 4.6 CM (ref 3.5–6)
LEFT VENTRICULAR POSTERIOR WALL IN END DIASTOLE: 1 CM
LEFT VENTRICULAR STROKE VOLUME: 59 ML
LV EF US.2D.A4C+ESTIMATED: 69 %
LVSV (TEICH): 59 ML
MV E'TISSUE VEL-LAT: 9 CM/S
MV E'TISSUE VEL-SEP: 8 CM/S
MV PEAK A VEL: 0.77 M/S
MV PEAK E VEL: 74 CM/S
MV STENOSIS PRESSURE HALF TIME: 59 MS
MV VALVE AREA P 1/2 METHOD: 3.73
RA PRESSURE ESTIMATED: 5 MMHG
RIGHT ATRIUM AREA SYSTOLE A4C: 14.4 CM2
RIGHT VENTRICLE ID DIMENSION: 3 CM
RV PSP: 28 MMHG
SL CV LEFT ATRIUM LENGTH A2C: 5.8 CM
SL CV LV EF: 60
SL CV PED ECHO LEFT VENTRICLE DIASTOLIC VOLUME (MOD BIPLANE) 2D: 97 ML
SL CV PED ECHO LEFT VENTRICLE SYSTOLIC VOLUME (MOD BIPLANE) 2D: 39 ML
TR MAX PG: 23 MMHG
TR PEAK VELOCITY: 2.4 M/S
TRICUSPID ANNULAR PLANE SYSTOLIC EXCURSION: 2.1 CM
TRICUSPID VALVE PEAK REGURGITATION VELOCITY: 2.4 M/S

## 2025-07-09 PROCEDURE — 93226 XTRNL ECG REC<48 HR SCAN A/R: CPT

## 2025-07-09 PROCEDURE — 93306 TTE W/DOPPLER COMPLETE: CPT

## 2025-07-09 PROCEDURE — 93306 TTE W/DOPPLER COMPLETE: CPT | Performed by: INTERNAL MEDICINE

## 2025-07-09 PROCEDURE — 93225 XTRNL ECG REC<48 HRS REC: CPT

## 2025-07-11 LAB
HBA1C MFR BLD: 5.8 %
LPA SERPL-SCNC: <10 NMOL/L

## 2025-07-16 PROCEDURE — 93227 XTRNL ECG REC<48 HR R&I: CPT | Performed by: INTERNAL MEDICINE

## 2025-07-28 ENCOUNTER — OFFICE VISIT (OUTPATIENT)
Dept: FAMILY MEDICINE CLINIC | Facility: CLINIC | Age: 55
End: 2025-07-28
Payer: COMMERCIAL

## 2025-07-28 VITALS
TEMPERATURE: 98.3 F | SYSTOLIC BLOOD PRESSURE: 124 MMHG | HEART RATE: 85 BPM | DIASTOLIC BLOOD PRESSURE: 68 MMHG | RESPIRATION RATE: 16 BRPM | WEIGHT: 253.5 LBS | OXYGEN SATURATION: 97 % | BODY MASS INDEX: 42.18 KG/M2

## 2025-07-28 DIAGNOSIS — I10 PRIMARY HYPERTENSION: Primary | ICD-10-CM

## 2025-07-28 PROCEDURE — 99213 OFFICE O/P EST LOW 20 MIN: CPT | Performed by: FAMILY MEDICINE

## 2025-07-29 ENCOUNTER — OFFICE VISIT (OUTPATIENT)
Dept: CARDIOLOGY CLINIC | Facility: CLINIC | Age: 55
End: 2025-07-29
Payer: COMMERCIAL

## 2025-07-29 VITALS
HEIGHT: 65 IN | WEIGHT: 250 LBS | SYSTOLIC BLOOD PRESSURE: 112 MMHG | HEART RATE: 77 BPM | OXYGEN SATURATION: 99 % | DIASTOLIC BLOOD PRESSURE: 70 MMHG | BODY MASS INDEX: 41.65 KG/M2

## 2025-07-29 DIAGNOSIS — I10 PRIMARY HYPERTENSION: Primary | ICD-10-CM

## 2025-07-29 DIAGNOSIS — R94.31 ABNORMAL EKG: ICD-10-CM

## 2025-07-29 DIAGNOSIS — E78.00 HYPERCHOLESTEROLEMIA: ICD-10-CM

## 2025-07-29 DIAGNOSIS — K20.90 ACUTE ESOPHAGITIS: ICD-10-CM

## 2025-07-29 DIAGNOSIS — R55 SYNCOPE, UNSPECIFIED SYNCOPE TYPE: ICD-10-CM

## 2025-07-29 PROCEDURE — 99212 OFFICE O/P EST SF 10 MIN: CPT | Performed by: INTERNAL MEDICINE

## 2025-07-29 RX ORDER — PANTOPRAZOLE SODIUM 40 MG/1
40 TABLET, DELAYED RELEASE ORAL DAILY
Qty: 90 TABLET | Refills: 1 | Status: SHIPPED | OUTPATIENT
Start: 2025-07-29

## 2025-08-04 ENCOUNTER — HOSPITAL ENCOUNTER (OUTPATIENT)
Dept: RADIOLOGY | Age: 55
Discharge: HOME | End: 2025-08-04
Payer: COMMERCIAL

## 2025-08-04 DIAGNOSIS — Z12.31 ENCOUNTER FOR SCREENING MAMMOGRAM FOR MALIGNANT NEOPLASM OF BREAST: ICD-10-CM

## 2025-08-04 PROCEDURE — 77067 SCR MAMMO BI INCL CAD: CPT

## 2025-08-04 PROCEDURE — 77063 BREAST TOMOSYNTHESIS BI: CPT
